# Patient Record
Sex: FEMALE | Race: ASIAN | NOT HISPANIC OR LATINO
[De-identification: names, ages, dates, MRNs, and addresses within clinical notes are randomized per-mention and may not be internally consistent; named-entity substitution may affect disease eponyms.]

---

## 2018-05-02 ENCOUNTER — APPOINTMENT (OUTPATIENT)
Dept: SURGERY | Facility: CLINIC | Age: 33
End: 2018-05-02
Payer: MEDICAID

## 2018-05-02 VITALS
SYSTOLIC BLOOD PRESSURE: 125 MMHG | HEIGHT: 64 IN | DIASTOLIC BLOOD PRESSURE: 87 MMHG | TEMPERATURE: 98.2 F | HEART RATE: 99 BPM | WEIGHT: 107 LBS | BODY MASS INDEX: 18.27 KG/M2

## 2018-05-02 DIAGNOSIS — R10.31 RIGHT LOWER QUADRANT PAIN: ICD-10-CM

## 2018-05-02 PROCEDURE — 99203 OFFICE O/P NEW LOW 30 MIN: CPT

## 2018-05-04 ENCOUNTER — TRANSCRIPTION ENCOUNTER (OUTPATIENT)
Age: 33
End: 2018-05-04

## 2018-07-23 ENCOUNTER — EMERGENCY (EMERGENCY)
Facility: HOSPITAL | Age: 33
LOS: 0 days | Discharge: HOME | End: 2018-07-24
Attending: EMERGENCY MEDICINE
Payer: MEDICAID

## 2018-07-23 VITALS
TEMPERATURE: 98 F | SYSTOLIC BLOOD PRESSURE: 129 MMHG | OXYGEN SATURATION: 97 % | DIASTOLIC BLOOD PRESSURE: 80 MMHG | WEIGHT: 106.48 LBS | HEART RATE: 89 BPM | RESPIRATION RATE: 17 BRPM | HEIGHT: 64 IN

## 2018-07-23 DIAGNOSIS — Z98.89 OTHER SPECIFIED POSTPROCEDURAL STATES: Chronic | ICD-10-CM

## 2018-07-23 DIAGNOSIS — H53.149 VISUAL DISCOMFORT, UNSPECIFIED: ICD-10-CM

## 2018-07-23 DIAGNOSIS — R51 HEADACHE: ICD-10-CM

## 2018-07-23 DIAGNOSIS — G43.909 MIGRAINE, UNSPECIFIED, NOT INTRACTABLE, WITHOUT STATUS MIGRAINOSUS: ICD-10-CM

## 2018-07-23 DIAGNOSIS — R11.10 VOMITING, UNSPECIFIED: ICD-10-CM

## 2018-07-23 LAB
ALBUMIN SERPL ELPH-MCNC: 3.9 G/DL — SIGNIFICANT CHANGE UP (ref 3.3–5)
ALP SERPL-CCNC: 75 U/L — SIGNIFICANT CHANGE UP (ref 40–120)
ALT FLD-CCNC: 26 U/L — SIGNIFICANT CHANGE UP (ref 12–78)
ANION GAP SERPL CALC-SCNC: 8 MMOL/L — SIGNIFICANT CHANGE UP (ref 5–17)
AST SERPL-CCNC: 21 U/L — SIGNIFICANT CHANGE UP (ref 15–37)
BASOPHILS # BLD AUTO: 0.03 K/UL — SIGNIFICANT CHANGE UP (ref 0–0.2)
BASOPHILS NFR BLD AUTO: 0.3 % — SIGNIFICANT CHANGE UP (ref 0–2)
BILIRUB SERPL-MCNC: 0.3 MG/DL — SIGNIFICANT CHANGE UP (ref 0.2–1.2)
BUN SERPL-MCNC: 8 MG/DL — SIGNIFICANT CHANGE UP (ref 7–23)
CALCIUM SERPL-MCNC: 9.1 MG/DL — SIGNIFICANT CHANGE UP (ref 8.5–10.1)
CHLORIDE SERPL-SCNC: 108 MMOL/L — SIGNIFICANT CHANGE UP (ref 96–108)
CO2 SERPL-SCNC: 26 MMOL/L — SIGNIFICANT CHANGE UP (ref 22–31)
CREAT SERPL-MCNC: 0.6 MG/DL — SIGNIFICANT CHANGE UP (ref 0.5–1.3)
EOSINOPHIL # BLD AUTO: 0.02 K/UL — SIGNIFICANT CHANGE UP (ref 0–0.5)
EOSINOPHIL NFR BLD AUTO: 0.2 % — SIGNIFICANT CHANGE UP (ref 0–6)
GLUCOSE SERPL-MCNC: 102 MG/DL — HIGH (ref 70–99)
HCG SERPL-ACNC: <1 MIU/ML — SIGNIFICANT CHANGE UP
HCT VFR BLD CALC: 37.9 % — SIGNIFICANT CHANGE UP (ref 34.5–45)
HGB BLD-MCNC: 11.8 G/DL — SIGNIFICANT CHANGE UP (ref 11.5–15.5)
IMM GRANULOCYTES NFR BLD AUTO: 0.2 % — SIGNIFICANT CHANGE UP (ref 0–1.5)
LYMPHOCYTES # BLD AUTO: 1.14 K/UL — SIGNIFICANT CHANGE UP (ref 1–3.3)
LYMPHOCYTES # BLD AUTO: 12.6 % — LOW (ref 13–44)
MCHC RBC-ENTMCNC: 25.2 PG — LOW (ref 27–34)
MCHC RBC-ENTMCNC: 31.1 GM/DL — LOW (ref 32–36)
MCV RBC AUTO: 80.8 FL — SIGNIFICANT CHANGE UP (ref 80–100)
MONOCYTES # BLD AUTO: 0.2 K/UL — SIGNIFICANT CHANGE UP (ref 0–0.9)
MONOCYTES NFR BLD AUTO: 2.2 % — SIGNIFICANT CHANGE UP (ref 2–14)
NEUTROPHILS # BLD AUTO: 7.66 K/UL — HIGH (ref 1.8–7.4)
NEUTROPHILS NFR BLD AUTO: 84.5 % — HIGH (ref 43–77)
NRBC # BLD: 0 /100 WBCS — SIGNIFICANT CHANGE UP (ref 0–0)
PLATELET # BLD AUTO: 239 K/UL — SIGNIFICANT CHANGE UP (ref 150–400)
POTASSIUM SERPL-MCNC: 4.1 MMOL/L — SIGNIFICANT CHANGE UP (ref 3.5–5.3)
POTASSIUM SERPL-SCNC: 4.1 MMOL/L — SIGNIFICANT CHANGE UP (ref 3.5–5.3)
PROT SERPL-MCNC: 8.3 GM/DL — SIGNIFICANT CHANGE UP (ref 6–8.3)
RBC # BLD: 4.69 M/UL — SIGNIFICANT CHANGE UP (ref 3.8–5.2)
RBC # FLD: 14.4 % — SIGNIFICANT CHANGE UP (ref 10.3–14.5)
SODIUM SERPL-SCNC: 142 MMOL/L — SIGNIFICANT CHANGE UP (ref 135–145)
WBC # BLD: 9.07 K/UL — SIGNIFICANT CHANGE UP (ref 3.8–10.5)
WBC # FLD AUTO: 9.07 K/UL — SIGNIFICANT CHANGE UP (ref 3.8–10.5)

## 2018-07-23 PROCEDURE — 70450 CT HEAD/BRAIN W/O DYE: CPT | Mod: 26

## 2018-07-23 PROCEDURE — 99284 EMERGENCY DEPT VISIT MOD MDM: CPT

## 2018-07-23 RX ORDER — ACETAMINOPHEN 500 MG
650 TABLET ORAL ONCE
Qty: 0 | Refills: 0 | Status: COMPLETED | OUTPATIENT
Start: 2018-07-23 | End: 2018-07-23

## 2018-07-23 RX ORDER — DIPHENHYDRAMINE HCL 50 MG
25 CAPSULE ORAL ONCE
Qty: 0 | Refills: 0 | Status: COMPLETED | OUTPATIENT
Start: 2018-07-23 | End: 2018-07-23

## 2018-07-23 RX ORDER — METOCLOPRAMIDE HCL 10 MG
10 TABLET ORAL ONCE
Qty: 0 | Refills: 0 | Status: COMPLETED | OUTPATIENT
Start: 2018-07-23 | End: 2018-07-23

## 2018-07-23 RX ORDER — SODIUM CHLORIDE 9 MG/ML
1000 INJECTION INTRAMUSCULAR; INTRAVENOUS; SUBCUTANEOUS ONCE
Qty: 0 | Refills: 0 | Status: COMPLETED | OUTPATIENT
Start: 2018-07-23 | End: 2018-07-23

## 2018-07-23 RX ADMIN — Medication 25 MILLIGRAM(S): at 22:27

## 2018-07-23 RX ADMIN — Medication 10 MILLIGRAM(S): at 22:27

## 2018-07-23 RX ADMIN — Medication 650 MILLIGRAM(S): at 22:27

## 2018-07-23 RX ADMIN — SODIUM CHLORIDE 1000 MILLILITER(S): 9 INJECTION INTRAMUSCULAR; INTRAVENOUS; SUBCUTANEOUS at 22:19

## 2018-07-23 NOTE — ED PROVIDER NOTE - OBJECTIVE STATEMENT
31yo female with pmh migraines presents with left sided headache, photophobia and vomit x 3. Pt states typical of her headache. Last exacerbation 2 years ago s/p epidural during pregnancy. Attempted to take tylenol but pt vomited.     ROS: No fever/chills. + photophobia, no eye pain/changes in vision, No ear pain/sore throat/dysphagia, No chest pain/palpitations. No SOB/cough/stridor. No abdominal pain, +N/V, no D, no black/bloody bm. No dysuria/frequency/discharge, + headache. No Dizziness.  No rash.  No numbness/tingling/weakness.

## 2018-07-23 NOTE — ED PROVIDER NOTE - MEDICAL DECISION MAKING DETAILS
miraine resolved, 0/10. Lab values do not require emergent intervention. Discussed results and outcome of testing with the patient.  Patient given copy of available results. Patient advised to please follow up with their PMD within the next 24 hours and return to the Emergency Department for worsening symptoms or any other concerns.

## 2018-07-23 NOTE — ED ADULT TRIAGE NOTE - CHIEF COMPLAINT QUOTE
pt states, "I am having migraines and vomiting". pt c/o pain to the enitre left side of head and vomited x3 today.

## 2018-07-24 VITALS
OXYGEN SATURATION: 98 % | SYSTOLIC BLOOD PRESSURE: 104 MMHG | TEMPERATURE: 98 F | RESPIRATION RATE: 16 BRPM | DIASTOLIC BLOOD PRESSURE: 56 MMHG | HEART RATE: 83 BPM

## 2018-07-24 NOTE — ED ADULT NURSE NOTE - OBJECTIVE STATEMENT
pt c/o migraine headache.  pt stated that her migraines started after having an epidural during childbirth.   no N/V at this time.

## 2018-11-01 ENCOUNTER — OUTPATIENT (OUTPATIENT)
Dept: OUTPATIENT SERVICES | Facility: HOSPITAL | Age: 33
LOS: 1 days | Discharge: HOME | End: 2018-11-01

## 2018-11-01 VITALS
RESPIRATION RATE: 18 BRPM | OXYGEN SATURATION: 97 % | DIASTOLIC BLOOD PRESSURE: 71 MMHG | HEART RATE: 90 BPM | SYSTOLIC BLOOD PRESSURE: 109 MMHG | TEMPERATURE: 98 F | WEIGHT: 110.23 LBS | HEIGHT: 64 IN

## 2018-11-01 VITALS
DIASTOLIC BLOOD PRESSURE: 71 MMHG | HEIGHT: 64 IN | SYSTOLIC BLOOD PRESSURE: 109 MMHG | OXYGEN SATURATION: 97 % | RESPIRATION RATE: 18 BRPM | WEIGHT: 110.23 LBS | TEMPERATURE: 98 F | HEART RATE: 90 BPM

## 2018-11-01 DIAGNOSIS — T83.83XA HEMORRHAGE DUE TO GENITOURINARY PROSTHETIC DEVICES, IMPLANTS AND GRAFTS, INITIAL ENCOUNTER: Chronic | ICD-10-CM

## 2018-11-01 DIAGNOSIS — Z01.818 ENCOUNTER FOR OTHER PREPROCEDURAL EXAMINATION: ICD-10-CM

## 2018-11-01 DIAGNOSIS — R22.2 LOCALIZED SWELLING, MASS AND LUMP, TRUNK: ICD-10-CM

## 2018-11-01 DIAGNOSIS — R22.9 LOCALIZED SWELLING, MASS AND LUMP, UNSPECIFIED: ICD-10-CM

## 2018-11-01 DIAGNOSIS — Z98.891 HISTORY OF UTERINE SCAR FROM PREVIOUS SURGERY: Chronic | ICD-10-CM

## 2018-11-01 DIAGNOSIS — Z98.89 OTHER SPECIFIED POSTPROCEDURAL STATES: Chronic | ICD-10-CM

## 2018-11-01 LAB
HCG UR QL: NEGATIVE — SIGNIFICANT CHANGE UP
HCT VFR BLD CALC: 38.3 % — SIGNIFICANT CHANGE UP (ref 34.5–45)
HGB BLD-MCNC: 12.4 G/DL — SIGNIFICANT CHANGE UP (ref 11.5–15.5)
MCHC RBC-ENTMCNC: 26.2 PG — LOW (ref 27–34)
MCHC RBC-ENTMCNC: 32.4 GM/DL — SIGNIFICANT CHANGE UP (ref 32–36)
MCV RBC AUTO: 80.8 FL — SIGNIFICANT CHANGE UP (ref 80–100)
NRBC # BLD: 0 /100 WBCS — SIGNIFICANT CHANGE UP (ref 0–0)
PLATELET # BLD AUTO: 301 K/UL — SIGNIFICANT CHANGE UP (ref 150–400)
RBC # BLD: 4.74 M/UL — SIGNIFICANT CHANGE UP (ref 3.8–5.2)
RBC # FLD: 13.7 % — SIGNIFICANT CHANGE UP (ref 10.3–14.5)
WBC # BLD: 8.1 K/UL — SIGNIFICANT CHANGE UP (ref 3.8–10.5)
WBC # FLD AUTO: 8.1 K/UL — SIGNIFICANT CHANGE UP (ref 3.8–10.5)

## 2018-11-01 NOTE — H&P PST ADULT - PMH
Localized swelling, mass and lump, unspecified    Migraines Localized swelling, mass and lump, unspecified    Migraines  secondary to epidural with

## 2018-11-01 NOTE — H&P PST ADULT - ASSESSMENT
excision of right abdominal wall mass on 11/8 with Dr. Hernandez 32 yo female scheduled for an excision of right abdominal wall mass on  with Dr. Held CAPRINI SCORE [CLOT]    AGE RELATED RISK FACTORS                                                       MOBILITY RELATED FACTORS  [ ] Age 41-60 years                                            (1 Point)                  [ ] Bed rest                                                        (1 Point)  [ ] Age: 61-74 years                                           (2 Points)                 [ ] Plaster cast                                                   (2 Points)  [ ] Age= 75 years                                              (3 Points)                 [ ] Bed bound for more than 72 hours                 (2 Points)    DISEASE RELATED RISK FACTORS                                               GENDER SPECIFIC FACTORS  [ ] Edema in the lower extremities                       (1 Point)                  [ ] Pregnancy                                                     (1 Point)  [ ] Varicose veins                                               (1 Point)                  [ ] Post-partum < 6 weeks                                   (1 Point)             [ ] BMI > 25 Kg/m2                                            (1 Point)                  [ ] Hormonal therapy  or oral contraception          (1 Point)                 [ ] Sepsis (in the previous month)                        (1 Point)                  [ ] History of pregnancy complications                 (1 point)  [ ] Pneumonia or serious lung disease                                               [ ] Unexplained or recurrent                     (1 Point)           (in the previous month)                               (1 Point)  [ ] Abnormal pulmonary function test                     (1 Point)                 SURGERY RELATED RISK FACTORS  [ ] Acute myocardial infarction                              (1 Point)                 [ ]  Section                                             (1 Point)  [ ] Congestive heart failure (in the previous month)  (1 Point)               [x ] Minor surgery                                                  (1 Point)   [ ] Inflammatory bowel disease                             (1 Point)                 [ ] Arthroscopic surgery                                        (2 Points)  [ ] Central venous access                                      (2 Points)                [ ] General surgery lasting more than 45 minutes   (2 Points)       [ ] Stroke (in the previous month)                          (5 Points)               [ ] Elective arthroplasty                                         (5 Points)                                                                                                                                               HEMATOLOGY RELATED FACTORS                                                 TRAUMA RELATED RISK FACTORS  [ ] Prior episodes of VTE                                     (3 Points)                [ ] Fracture of the hip, pelvis, or leg                       (5 Points)  [ ] Positive family history for VTE                         (3 Points)                 [ ] Acute spinal cord injury (in the previous month)  (5 Points)  [ ] Prothrombin 98905 A                                     (3 Points)                 [ ] Paralysis  (less than 1 month)                             (5 Points)  [ ] Factor V Leiden                                             (3 Points)                  [ ] Multiple Trauma within 1 month                        (5 Points)  [ ] Lupus anticoagulants                                     (3 Points)                                                           [ ] Anticardiolipin antibodies                               (3 Points)                                                       [ ] High homocysteine in the blood                      (3 Points)                                             [ ] Other congenital or acquired thrombophilia      (3 Points)                                                [ ] Heparin induced thrombocytopenia                  (3 Points)                                          Total Score [   1      ]

## 2018-11-01 NOTE — H&P PST ADULT - PROBLEM SELECTOR PLAN 2
Labs MC Pre op and Hibiclens instructions reviewed and given. Take routine am meds DOS with sip of water. Avoid NSAIDs and OTC supplements. Verbalized understanding Labs - CBC, UCG  No MC needed or requested  Pre op and Hibiclens instructions reviewed and given. Hold Prenatals. Avoid NSAIDs and OTC supplements. Verbalized understanding

## 2018-11-01 NOTE — H&P PST ADULT - HISTORY OF PRESENT ILLNESS
excision of right abdominal wall mass on 11/8 with Dr. Hernandez e33 yo healthy female scheduled for an excision of right abdominal wall mass on  with Dr. Hernandez. Reports a right sided lump under her  scar since 2018. C/O pain with activity. Reports an increase in size. Denies abd pain, N/V and bowel. 32 yo healthy female scheduled for an excision of right abdominal wall mass on  with Dr. Hernandez. Reports a right sided lump under her  scar since 2018. C/O pain with activity. Reports an increase in size. Denies abd pain, N/V and bowel chnages.

## 2018-11-01 NOTE — H&P PST ADULT - FAMILY HISTORY
Mother  Still living? Yes, Estimated age: Age Unknown  Family history of hypertension, Age at diagnosis: Age Unknown     Father  Still living? Yes, Estimated age: Age Unknown  Family history of gout, Age at diagnosis: Age Unknown  Family history of diabetes mellitus (DM), Age at diagnosis: Age Unknown

## 2018-11-01 NOTE — H&P PST ADULT - NSANTHOSAYNRD_GEN_A_CORE
No. DOMONIQUE screening performed.  STOP BANG Legend: 0-2 = LOW Risk; 3-4 = INTERMEDIATE Risk; 5-8 = HIGH Risk

## 2018-11-01 NOTE — ASU PATIENT PROFILE, ADULT - PSH
H/O  section  2015  History of removal of ovarian cyst    Intermenstrual spotting due to intrauterine device (IUD)

## 2018-11-02 PROBLEM — G43.909 MIGRAINE, UNSPECIFIED, NOT INTRACTABLE, WITHOUT STATUS MIGRAINOSUS: Chronic | Status: ACTIVE | Noted: 2018-07-24

## 2018-11-07 ENCOUNTER — TRANSCRIPTION ENCOUNTER (OUTPATIENT)
Age: 33
End: 2018-11-07

## 2018-11-08 ENCOUNTER — OUTPATIENT (OUTPATIENT)
Dept: OUTPATIENT SERVICES | Facility: HOSPITAL | Age: 33
LOS: 1 days | Discharge: HOME | End: 2018-11-08
Payer: MEDICAID

## 2018-11-08 ENCOUNTER — RESULT REVIEW (OUTPATIENT)
Age: 33
End: 2018-11-08

## 2018-11-08 ENCOUNTER — APPOINTMENT (OUTPATIENT)
Dept: SURGERY | Facility: HOSPITAL | Age: 33
End: 2018-11-08

## 2018-11-08 VITALS
HEIGHT: 64 IN | SYSTOLIC BLOOD PRESSURE: 95 MMHG | DIASTOLIC BLOOD PRESSURE: 58 MMHG | OXYGEN SATURATION: 100 % | HEART RATE: 75 BPM | TEMPERATURE: 97 F | WEIGHT: 110.23 LBS | RESPIRATION RATE: 16 BRPM

## 2018-11-08 VITALS
HEART RATE: 77 BPM | SYSTOLIC BLOOD PRESSURE: 97 MMHG | TEMPERATURE: 97 F | OXYGEN SATURATION: 99 % | RESPIRATION RATE: 18 BRPM | DIASTOLIC BLOOD PRESSURE: 56 MMHG

## 2018-11-08 DIAGNOSIS — Z98.891 HISTORY OF UTERINE SCAR FROM PREVIOUS SURGERY: Chronic | ICD-10-CM

## 2018-11-08 DIAGNOSIS — Z98.89 OTHER SPECIFIED POSTPROCEDURAL STATES: Chronic | ICD-10-CM

## 2018-11-08 DIAGNOSIS — T83.83XA HEMORRHAGE DUE TO GENITOURINARY PROSTHETIC DEVICES, IMPLANTS AND GRAFTS, INITIAL ENCOUNTER: Chronic | ICD-10-CM

## 2018-11-08 PROCEDURE — 22901 EXC ABDL TUM DEEP 5 CM/>: CPT | Mod: AS

## 2018-11-08 PROCEDURE — 22901 EXC ABDL TUM DEEP 5 CM/>: CPT

## 2018-11-08 PROCEDURE — 88305 TISSUE EXAM BY PATHOLOGIST: CPT | Mod: 26

## 2018-11-08 RX ORDER — ONDANSETRON 8 MG/1
4 TABLET, FILM COATED ORAL ONCE
Qty: 0 | Refills: 0 | Status: DISCONTINUED | OUTPATIENT
Start: 2018-11-08 | End: 2018-11-08

## 2018-11-08 RX ORDER — ACETAMINOPHEN 500 MG
1000 TABLET ORAL ONCE
Qty: 0 | Refills: 0 | Status: COMPLETED | OUTPATIENT
Start: 2018-11-08 | End: 2018-11-08

## 2018-11-08 RX ORDER — SODIUM CHLORIDE 9 MG/ML
1000 INJECTION, SOLUTION INTRAVENOUS
Qty: 0 | Refills: 0 | Status: DISCONTINUED | OUTPATIENT
Start: 2018-11-08 | End: 2018-11-08

## 2018-11-08 RX ORDER — SODIUM CHLORIDE 9 MG/ML
3 INJECTION INTRAMUSCULAR; INTRAVENOUS; SUBCUTANEOUS EVERY 8 HOURS
Qty: 0 | Refills: 0 | Status: DISCONTINUED | OUTPATIENT
Start: 2018-11-08 | End: 2018-11-08

## 2018-11-08 RX ADMIN — SODIUM CHLORIDE 75 MILLILITER(S): 9 INJECTION, SOLUTION INTRAVENOUS at 11:09

## 2018-11-08 RX ADMIN — Medication 1000 MILLIGRAM(S): at 11:23

## 2018-11-08 RX ADMIN — Medication 400 MILLIGRAM(S): at 11:08

## 2018-11-08 NOTE — BRIEF OPERATIVE NOTE - PROCEDURE
<<-----Click on this checkbox to enter Procedure Excision of mass of abdominal wall  11/08/2018    Active  DYAN

## 2018-11-08 NOTE — ASU DISCHARGE PLAN (ADULT/PEDIATRIC). - MEDICATION SUMMARY - MEDICATIONS TO TAKE
I will START or STAY ON the medications listed below when I get home from the hospital:    Prenatal Vitamins  -- 1 tab(s) by mouth once a day  -- Indication: For .    Tylenol 500 mg oral tablet  -- orally once a day, As Needed  -- Indication: For .

## 2018-11-08 NOTE — ASU DISCHARGE PLAN (ADULT/PEDIATRIC). - NOTIFY
Unable to Urinate/Bleeding that does not stop/Swelling that continues/Persistent Nausea and Vomiting/Fever greater than 101

## 2018-11-15 DIAGNOSIS — Z79.1 LONG TERM (CURRENT) USE OF NON-STEROIDAL ANTI-INFLAMMATORIES (NSAID): ICD-10-CM

## 2018-11-15 DIAGNOSIS — N80.8 OTHER ENDOMETRIOSIS: ICD-10-CM

## 2018-11-16 PROBLEM — R22.9 LOCALIZED SWELLING, MASS AND LUMP, UNSPECIFIED: Chronic | Status: ACTIVE | Noted: 2018-11-01

## 2018-11-19 ENCOUNTER — APPOINTMENT (OUTPATIENT)
Dept: SURGERY | Facility: CLINIC | Age: 33
End: 2018-11-19
Payer: MEDICAID

## 2018-11-19 VITALS
TEMPERATURE: 98.2 F | HEART RATE: 76 BPM | SYSTOLIC BLOOD PRESSURE: 103 MMHG | BODY MASS INDEX: 18.27 KG/M2 | DIASTOLIC BLOOD PRESSURE: 69 MMHG | HEIGHT: 64 IN | WEIGHT: 107 LBS

## 2018-11-19 DIAGNOSIS — Z09 ENCOUNTER FOR FOLLOW-UP EXAMINATION AFTER COMPLETED TREATMENT FOR CONDITIONS OTHER THAN MALIGNANT NEOPLASM: ICD-10-CM

## 2018-11-19 PROCEDURE — 99024 POSTOP FOLLOW-UP VISIT: CPT

## 2019-03-04 ENCOUNTER — TRANSCRIPTION ENCOUNTER (OUTPATIENT)
Age: 34
End: 2019-03-04

## 2019-10-09 ENCOUNTER — EMERGENCY (EMERGENCY)
Facility: HOSPITAL | Age: 34
LOS: 0 days | Discharge: HOME | End: 2019-10-09
Attending: EMERGENCY MEDICINE
Payer: COMMERCIAL

## 2019-10-09 VITALS
HEIGHT: 64 IN | SYSTOLIC BLOOD PRESSURE: 113 MMHG | DIASTOLIC BLOOD PRESSURE: 68 MMHG | RESPIRATION RATE: 16 BRPM | TEMPERATURE: 99 F | OXYGEN SATURATION: 100 % | HEART RATE: 72 BPM | WEIGHT: 113.98 LBS
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
## 2019-10-09 DIAGNOSIS — Z98.891 HISTORY OF UTERINE SCAR FROM PREVIOUS SURGERY: Chronic | ICD-10-CM

## 2019-10-09 DIAGNOSIS — M79.675 PAIN IN LEFT TOE(S): ICD-10-CM

## 2019-10-09 DIAGNOSIS — Z98.89 OTHER SPECIFIED POSTPROCEDURAL STATES: Chronic | ICD-10-CM

## 2019-10-09 DIAGNOSIS — T83.83XA HEMORRHAGE DUE TO GENITOURINARY PROSTHETIC DEVICES, IMPLANTS AND GRAFTS, INITIAL ENCOUNTER: Chronic | ICD-10-CM
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
## 2019-10-09 DIAGNOSIS — S99.822A OTHER SPECIFIED INJURIES OF LEFT FOOT, INITIAL ENCOUNTER: ICD-10-CM

## 2019-10-09 PROCEDURE — 99283 EMERGENCY DEPT VISIT LOW MDM: CPT

## 2019-10-09 PROCEDURE — 73630 X-RAY EXAM OF FOOT: CPT | Mod: 26,LT

## 2019-10-09 RX ORDER — IBUPROFEN 200 MG
600 TABLET ORAL ONCE
Refills: 0 | Status: COMPLETED | OUTPATIENT
Start: 2019-10-09 | End: 2019-10-09

## 2019-10-09 RX ADMIN — Medication 600 MILLIGRAM(S): at 20:59

## 2019-10-09 RX ADMIN — Medication 1 TABLET(S): at 20:54

## 2019-10-09 RX ADMIN — Medication 600 MILLIGRAM(S): at 20:54

## 2019-10-09 NOTE — ED PROVIDER NOTE - PATIENT PORTAL LINK FT
You can access the FollowMyHealth Patient Portal offered by [facility] by registering at the following website: [email or web address]. By joining [facility] [employer]’s FollowMyHealth portal, you will also be able to view your health information using other applications (apps) compatible with our system.

## 2019-10-09 NOTE — ED PROVIDER NOTE - CARE PROVIDER_API CALL
Marcial Das (DPM)  Surgery  [address] [place] [address], [address]  [place] [address]  Phone: [phone]  Fax: [phone]  Follow Up Time:

## 2019-11-21 ENCOUNTER — APPOINTMENT (OUTPATIENT)
Dept: OBGYN | Facility: CLINIC | Age: 34
End: 2019-11-21
Payer: COMMERCIAL

## 2019-11-21 PROCEDURE — 99213 OFFICE O/P EST LOW 20 MIN: CPT | Mod: 25

## 2019-11-21 PROCEDURE — 58301 REMOVE INTRAUTERINE DEVICE: CPT

## 2019-12-02 NOTE — ASU PATIENT PROFILE, ADULT - PREOP PAIN SCORE
"""Informed patient that their cataract(s) are not visually significant or do not meet the criteria for ""
"""Start Artificial tears left eye three times a day
5

## 2020-02-10 ENCOUNTER — APPOINTMENT (OUTPATIENT)
Dept: OBGYN | Facility: CLINIC | Age: 35
End: 2020-02-10

## 2020-04-02 ENCOUNTER — APPOINTMENT (OUTPATIENT)
Dept: OBGYN | Facility: CLINIC | Age: 35
End: 2020-04-02

## 2020-04-10 ENCOUNTER — APPOINTMENT (OUTPATIENT)
Dept: NEUROLOGY | Facility: CLINIC | Age: 35
End: 2020-04-10
Payer: MEDICAID

## 2020-04-10 PROCEDURE — 99204 OFFICE O/P NEW MOD 45 MIN: CPT | Mod: 95

## 2020-04-10 NOTE — PHYSICAL EXAM
[Person] : oriented to person [Place] : oriented to place [Time] : oriented to time [Short Term Intact] : short term memory intact [Fluency] : fluency intact [Current Events] : adequate knowledge of current events [Cranial Nerves Oculomotor (III)] : extraocular motion intact [Cranial Nerves Facial (VII)] : face symmetrical [Cranial Nerves Vestibulocochlear (VIII)] : hearing was intact bilaterally [Cranial Nerves Accessory (XI - Cranial And Spinal)] : head turning and shoulder shrug symmetric [Cranial Nerves Hypoglossal (XII)] : there was no tongue deviation with protrusion [Motor Strength] : muscle strength was normal in all four extremities [Abnormal Walk] : normal gait [Coordination - Dysmetria Impaired Finger-to-Nose Bilateral] : not present

## 2020-04-10 NOTE — DISCUSSION/SUMMARY
[FreeTextEntry1] : 34 W who is here for new visit. She may have had migraine exacerbation due to her pregnancy. She was advised to continue tylenol prn. She will f/u with me prn.\par \par physician location: home\par patient location: home\par \par I spent 25 minutes of face to face time, during which more than 50% of the time was spent on counseling. I explained the diagnosis, other possible diagnoses, workup, and management, as well as answered any questions.\par \par This is a telemedicine visit that was performed using real time 2-way audiovisual technology. Verbal consent to participate in video visit was obtained and patient was aware of their right to refuse to participate in services delivered via telemedicine and the alternative and potential limitations of participating in a telemedicine visit vs a face-to-face visit; I have also informed the patient of my current location in the [facility] and the names of all persons participating in the telemedicine service and their role in the encounter. The patient agrees to have this service via Telehealth.\par \par  This visit occurred during the Coronavirus (COVID-19) Public Health Emergency. I discussed with the patient the nature of our telemedicine visits, that: \par • I would evaluate the patient and recommend diagnostics and treatments based on my assessment \par • Our sessions are not being recorded and that personal health information is protected \par • Our team would provide follow up care in person if/when the patient needs it.\par

## 2020-04-10 NOTE — HISTORY OF PRESENT ILLNESS
[FreeTextEntry1] : verbal consent given on 04/10/2020 and 11:31  by SHANNAN MURPHY at [address]\[place] [address]\par \par Pt was last seen in 2016. Since then, she was tried on nortriptyline but b/c it caused sedation, she stopped taking it. She states over the past 2 months, she had migraine exacerbation. Now that she's on the 2nd month of her pregnancy, she is feeling better. She has nausea due to pregnancy.\par \par \par CONSENT FOR VIDEO MEDICAL VISIT1. I understand that my physician wishes me to engage in a telemedicine consultation.2. My physician has explained to me how the video conferencing technology will be used to affect such a consultation will not be the same as a direct patient/health care provider visit due to the fact that I will not be in the same room as my physician 3. I understand there are potential risks to this technology, including interruptions, unauthorized access and technical difficulties. I understand that my health care provider or I can discontinue the telemedicine consult/visit if it is felt that the videoconferencing connections are not adequate for the situation.4. I understand that my healthcare information may be shared with other individuals for scheduling and billing purposes. Others may also be present during the consultation other than my physician and consulting health care provider in order to operate the video equipment. The above mentioned people will all maintain confidentiality of the information obtained. I further understand that I will be informed of their presence in the consultation and thus will have the right to request the following: (1) omit specific details of my medical history/physical examination that are personally sensitive to me; (2) ask non-medical personnel to leave the telemedicine examination room: and or (3) terminate the consultation at any time.5. I have had the alternatives to a telemedicine consultation explained to me, and in choosing to participate in a telemedicine consultation. I understand that some parts of the exam involving physical tests may be conducted by individuals at my location at the direction of the consulting health care provider.6. In an emergent consultation, I understand that the responsibility of the telemedicine consulting specialist is to advise my local practitioner and that the specialist’s responsibility will conclude upon the termination of the video conference connection.7. I understand that billing will occur from both my physician and as a facility fee from the site from which I am presented.8. I have had a direct conversation with my physician, during which I had the opportunity to ask questions in regard to this procedure. My questions have been answered and the risks, benefits and any practical alternatives have been discussed with me in a language in which I understand\par

## 2020-04-23 ENCOUNTER — APPOINTMENT (OUTPATIENT)
Dept: ANTEPARTUM | Facility: CLINIC | Age: 35
End: 2020-04-23
Payer: MEDICAID

## 2020-04-23 PROCEDURE — 76801 OB US < 14 WKS SINGLE FETUS: CPT

## 2020-05-07 ENCOUNTER — APPOINTMENT (OUTPATIENT)
Dept: ANTEPARTUM | Facility: CLINIC | Age: 35
End: 2020-05-07
Payer: MEDICAID

## 2020-05-07 PROCEDURE — 76801 OB US < 14 WKS SINGLE FETUS: CPT

## 2020-05-07 PROCEDURE — 76813 OB US NUCHAL MEAS 1 GEST: CPT

## 2020-05-21 ENCOUNTER — APPOINTMENT (OUTPATIENT)
Dept: ANTEPARTUM | Facility: CLINIC | Age: 35
End: 2020-05-21
Payer: MEDICAID

## 2020-05-21 PROCEDURE — 76817 TRANSVAGINAL US OBSTETRIC: CPT

## 2020-05-21 PROCEDURE — 76815 OB US LIMITED FETUS(S): CPT

## 2020-06-04 ENCOUNTER — APPOINTMENT (OUTPATIENT)
Dept: ANTEPARTUM | Facility: CLINIC | Age: 35
End: 2020-06-04
Payer: MEDICAID

## 2020-06-04 PROCEDURE — 76817 TRANSVAGINAL US OBSTETRIC: CPT

## 2020-06-11 ENCOUNTER — APPOINTMENT (OUTPATIENT)
Dept: OBGYN | Facility: CLINIC | Age: 35
End: 2020-06-11
Payer: MEDICAID

## 2020-06-11 PROCEDURE — 96372 THER/PROPH/DIAG INJ SC/IM: CPT

## 2020-06-18 ENCOUNTER — APPOINTMENT (OUTPATIENT)
Dept: ANTEPARTUM | Facility: CLINIC | Age: 35
End: 2020-06-18
Payer: MEDICAID

## 2020-06-18 PROCEDURE — 76817 TRANSVAGINAL US OBSTETRIC: CPT

## 2020-06-18 PROCEDURE — 76815 OB US LIMITED FETUS(S): CPT

## 2020-06-25 ENCOUNTER — APPOINTMENT (OUTPATIENT)
Dept: ANTEPARTUM | Facility: CLINIC | Age: 35
End: 2020-06-25
Payer: MEDICAID

## 2020-06-25 PROCEDURE — 76817 TRANSVAGINAL US OBSTETRIC: CPT

## 2020-06-25 PROCEDURE — 76811 OB US DETAILED SNGL FETUS: CPT

## 2020-07-02 ENCOUNTER — APPOINTMENT (OUTPATIENT)
Dept: OBGYN | Facility: CLINIC | Age: 35
End: 2020-07-02
Payer: MEDICAID

## 2020-07-02 PROCEDURE — 96372 THER/PROPH/DIAG INJ SC/IM: CPT

## 2020-07-09 ENCOUNTER — APPOINTMENT (OUTPATIENT)
Dept: ANTEPARTUM | Facility: CLINIC | Age: 35
End: 2020-07-09
Payer: MEDICAID

## 2020-07-09 ENCOUNTER — APPOINTMENT (OUTPATIENT)
Dept: OBGYN | Facility: CLINIC | Age: 35
End: 2020-07-09
Payer: MEDICAID

## 2020-07-09 PROCEDURE — 0502F SUBSEQUENT PRENATAL CARE: CPT

## 2020-07-09 PROCEDURE — 76815 OB US LIMITED FETUS(S): CPT

## 2020-07-09 PROCEDURE — 76817 TRANSVAGINAL US OBSTETRIC: CPT

## 2020-07-16 ENCOUNTER — APPOINTMENT (OUTPATIENT)
Dept: OBGYN | Facility: CLINIC | Age: 35
End: 2020-07-16
Payer: MEDICAID

## 2020-07-16 PROCEDURE — 96372 THER/PROPH/DIAG INJ SC/IM: CPT

## 2020-07-23 ENCOUNTER — APPOINTMENT (OUTPATIENT)
Dept: OBGYN | Facility: CLINIC | Age: 35
End: 2020-07-23
Payer: MEDICAID

## 2020-07-23 PROCEDURE — 76816 OB US FOLLOW-UP PER FETUS: CPT

## 2020-07-23 PROCEDURE — 76817 TRANSVAGINAL US OBSTETRIC: CPT

## 2020-07-30 ENCOUNTER — APPOINTMENT (OUTPATIENT)
Dept: OBGYN | Facility: CLINIC | Age: 35
End: 2020-07-30
Payer: MEDICAID

## 2020-07-30 PROCEDURE — 96372 THER/PROPH/DIAG INJ SC/IM: CPT

## 2020-08-06 ENCOUNTER — APPOINTMENT (OUTPATIENT)
Dept: OBGYN | Facility: CLINIC | Age: 35
End: 2020-08-06
Payer: MEDICAID

## 2020-08-06 PROCEDURE — 0502F SUBSEQUENT PRENATAL CARE: CPT

## 2020-08-13 ENCOUNTER — APPOINTMENT (OUTPATIENT)
Dept: OBGYN | Facility: CLINIC | Age: 35
End: 2020-08-13
Payer: MEDICAID

## 2020-08-13 PROCEDURE — 96372 THER/PROPH/DIAG INJ SC/IM: CPT

## 2020-08-20 ENCOUNTER — APPOINTMENT (OUTPATIENT)
Dept: OBGYN | Facility: CLINIC | Age: 35
End: 2020-08-20
Payer: MEDICAID

## 2020-08-20 PROCEDURE — 0502F SUBSEQUENT PRENATAL CARE: CPT

## 2020-08-27 ENCOUNTER — APPOINTMENT (OUTPATIENT)
Dept: OBGYN | Facility: CLINIC | Age: 35
End: 2020-08-27
Payer: MEDICAID

## 2020-08-27 PROCEDURE — 96372 THER/PROPH/DIAG INJ SC/IM: CPT

## 2020-09-03 ENCOUNTER — APPOINTMENT (OUTPATIENT)
Dept: OBGYN | Facility: CLINIC | Age: 35
End: 2020-09-03
Payer: MEDICAID

## 2020-09-03 PROCEDURE — 96372 THER/PROPH/DIAG INJ SC/IM: CPT

## 2020-09-10 ENCOUNTER — APPOINTMENT (OUTPATIENT)
Dept: ANTEPARTUM | Facility: CLINIC | Age: 35
End: 2020-09-10
Payer: MEDICAID

## 2020-09-10 PROCEDURE — 76819 FETAL BIOPHYS PROFIL W/O NST: CPT

## 2020-09-10 PROCEDURE — 76816 OB US FOLLOW-UP PER FETUS: CPT

## 2020-09-17 ENCOUNTER — APPOINTMENT (OUTPATIENT)
Dept: OBGYN | Facility: CLINIC | Age: 35
End: 2020-09-17
Payer: MEDICAID

## 2020-09-17 PROCEDURE — 96372 THER/PROPH/DIAG INJ SC/IM: CPT

## 2020-09-24 ENCOUNTER — APPOINTMENT (OUTPATIENT)
Dept: OBGYN | Facility: CLINIC | Age: 35
End: 2020-09-24
Payer: MEDICAID

## 2020-09-24 PROCEDURE — 96372 THER/PROPH/DIAG INJ SC/IM: CPT

## 2020-10-01 ENCOUNTER — APPOINTMENT (OUTPATIENT)
Dept: OBGYN | Facility: CLINIC | Age: 35
End: 2020-10-01
Payer: MEDICAID

## 2020-10-01 PROCEDURE — 96372 THER/PROPH/DIAG INJ SC/IM: CPT

## 2020-10-03 ENCOUNTER — OUTPATIENT (OUTPATIENT)
Dept: INPATIENT UNIT | Facility: HOSPITAL | Age: 35
LOS: 1 days | Discharge: HOME | End: 2020-10-03
Payer: MEDICAID

## 2020-10-03 VITALS
HEART RATE: 97 BPM | TEMPERATURE: 98 F | RESPIRATION RATE: 16 BRPM | DIASTOLIC BLOOD PRESSURE: 71 MMHG | SYSTOLIC BLOOD PRESSURE: 111 MMHG

## 2020-10-03 VITALS — HEART RATE: 103 BPM | SYSTOLIC BLOOD PRESSURE: 93 MMHG | DIASTOLIC BLOOD PRESSURE: 52 MMHG

## 2020-10-03 DIAGNOSIS — Z98.891 HISTORY OF UTERINE SCAR FROM PREVIOUS SURGERY: Chronic | ICD-10-CM

## 2020-10-03 DIAGNOSIS — T83.83XA HEMORRHAGE DUE TO GENITOURINARY PROSTHETIC DEVICES, IMPLANTS AND GRAFTS, INITIAL ENCOUNTER: Chronic | ICD-10-CM

## 2020-10-03 DIAGNOSIS — O26.899 OTHER SPECIFIED PREGNANCY RELATED CONDITIONS, UNSPECIFIED TRIMESTER: ICD-10-CM

## 2020-10-03 DIAGNOSIS — Z98.89 OTHER SPECIFIED POSTPROCEDURAL STATES: Chronic | ICD-10-CM

## 2020-10-03 DIAGNOSIS — Z3A.00 WEEKS OF GESTATION OF PREGNANCY NOT SPECIFIED: ICD-10-CM

## 2020-10-03 LAB
APPEARANCE UR: CLEAR — SIGNIFICANT CHANGE UP
BASOPHILS # BLD AUTO: 0.04 K/UL — SIGNIFICANT CHANGE UP (ref 0–0.2)
BASOPHILS NFR BLD AUTO: 0.4 % — SIGNIFICANT CHANGE UP (ref 0–2)
BILIRUB UR-MCNC: NEGATIVE — SIGNIFICANT CHANGE UP
BLD GP AB SCN SERPL QL: NEGATIVE — SIGNIFICANT CHANGE UP
BLOOD UR QL VISUAL: NEGATIVE — SIGNIFICANT CHANGE UP
COLOR SPEC: SIGNIFICANT CHANGE UP
EOSINOPHIL # BLD AUTO: 0.44 K/UL — SIGNIFICANT CHANGE UP (ref 0–0.5)
EOSINOPHIL NFR BLD AUTO: 4.2 % — SIGNIFICANT CHANGE UP (ref 0–6)
GLUCOSE UR-MCNC: NEGATIVE — SIGNIFICANT CHANGE UP
HCT VFR BLD CALC: 37.1 % — SIGNIFICANT CHANGE UP (ref 34.5–45)
HGB BLD-MCNC: 12.1 G/DL — SIGNIFICANT CHANGE UP (ref 11.5–15.5)
IMM GRANULOCYTES NFR BLD AUTO: 1.7 % — HIGH (ref 0–1.5)
KETONES UR-MCNC: NEGATIVE — SIGNIFICANT CHANGE UP
LEUKOCYTE ESTERASE UR-ACNC: NEGATIVE — SIGNIFICANT CHANGE UP
LYMPHOCYTES # BLD AUTO: 1.7 K/UL — SIGNIFICANT CHANGE UP (ref 1–3.3)
LYMPHOCYTES # BLD AUTO: 16.1 % — SIGNIFICANT CHANGE UP (ref 13–44)
MCHC RBC-ENTMCNC: 27.8 PG — SIGNIFICANT CHANGE UP (ref 27–34)
MCHC RBC-ENTMCNC: 32.6 % — SIGNIFICANT CHANGE UP (ref 32–36)
MCV RBC AUTO: 85.3 FL — SIGNIFICANT CHANGE UP (ref 80–100)
MONOCYTES # BLD AUTO: 1.02 K/UL — HIGH (ref 0–0.9)
MONOCYTES NFR BLD AUTO: 9.6 % — SIGNIFICANT CHANGE UP (ref 2–14)
NEUTROPHILS # BLD AUTO: 7.2 K/UL — SIGNIFICANT CHANGE UP (ref 1.8–7.4)
NEUTROPHILS NFR BLD AUTO: 68 % — SIGNIFICANT CHANGE UP (ref 43–77)
NITRITE UR-MCNC: NEGATIVE — SIGNIFICANT CHANGE UP
NRBC # FLD: 0 K/UL — SIGNIFICANT CHANGE UP (ref 0–0)
PH UR: 7 — SIGNIFICANT CHANGE UP (ref 5–8)
PLATELET # BLD AUTO: 279 K/UL — SIGNIFICANT CHANGE UP (ref 150–400)
PMV BLD: 11 FL — SIGNIFICANT CHANGE UP (ref 7–13)
PROT UR-MCNC: NEGATIVE — SIGNIFICANT CHANGE UP
RBC # BLD: 4.35 M/UL — SIGNIFICANT CHANGE UP (ref 3.8–5.2)
RBC # FLD: 16.3 % — HIGH (ref 10.3–14.5)
RH IG SCN BLD-IMP: POSITIVE — SIGNIFICANT CHANGE UP
SP GR SPEC: 1.01 — SIGNIFICANT CHANGE UP (ref 1–1.04)
UROBILINOGEN FLD QL: NORMAL — SIGNIFICANT CHANGE UP
WBC # BLD: 10.58 K/UL — HIGH (ref 3.8–10.5)
WBC # FLD AUTO: 10.58 K/UL — HIGH (ref 3.8–10.5)

## 2020-10-03 PROCEDURE — 99214 OFFICE O/P EST MOD 30 MIN: CPT

## 2020-10-03 RX ORDER — ACETAMINOPHEN 500 MG
0 TABLET ORAL
Qty: 0 | Refills: 0 | DISCHARGE

## 2020-10-03 RX ORDER — SODIUM CHLORIDE 9 MG/ML
1000 INJECTION, SOLUTION INTRAVENOUS
Refills: 0 | Status: DISCONTINUED | OUTPATIENT
Start: 2020-10-03 | End: 2020-10-18

## 2020-10-03 RX ORDER — NIFEDIPINE 30 MG
10 TABLET, EXTENDED RELEASE 24 HR ORAL ONCE
Refills: 0 | Status: COMPLETED | OUTPATIENT
Start: 2020-10-03 | End: 2020-10-03

## 2020-10-03 RX ORDER — SODIUM CHLORIDE 9 MG/ML
1000 INJECTION, SOLUTION INTRAVENOUS ONCE
Refills: 0 | Status: COMPLETED | OUTPATIENT
Start: 2020-10-03 | End: 2020-10-03

## 2020-10-03 RX ADMIN — Medication 10 MILLIGRAM(S): at 17:59

## 2020-10-03 RX ADMIN — SODIUM CHLORIDE 2000 MILLILITER(S): 9 INJECTION, SOLUTION INTRAVENOUS at 17:48

## 2020-10-03 RX ADMIN — Medication 12 MILLIGRAM(S): at 17:50

## 2020-10-03 RX ADMIN — SODIUM CHLORIDE 125 MILLILITER(S): 9 INJECTION, SOLUTION INTRAVENOUS at 18:48

## 2020-10-03 RX ADMIN — Medication 10 MILLIGRAM(S): at 18:41

## 2020-10-03 NOTE — OB PROVIDER TRIAGE NOTE - NS_CHIEFCOMPLAINTOTHER_OBGYN_ALL_OB_FT
Presents with c/o "severe abdominal pain" that comes and goes since 1400. Denies LOF, VB and reports GFM.

## 2020-10-03 NOTE — OB PROVIDER TRIAGE NOTE - NSHPPHYSICALEXAM_GEN_ALL_CORE
Assessment reveals VSS. Abdomen soft, moderately tender upon palpation in all 4 quadrants. No rebound no guarding  VE 0.5/50/-3    UA sent Assessment reveals VSS. Abdomen soft, moderately tender upon palpation in all 4 quadrants. No rebound no guarding  VE 0.5/50/-3    UA sent    1745: Cat 1 FHT, ctx 3-5 on toco. Patient reports the abdominal pain was worse when it first started and is a little better at this time.   D/W Dr. Haley      LR 1000cc bolus      Beta 12mg IM      Procardia 10mg PO x 1 Assessment reveals VSS. Abdomen soft, moderately tender upon palpation in all 4 quadrants. No rebound no guarding  VE 0.5/50/-3    UA sent    1745: Cat 1 FHT, ctx 3-5 on toco. Patient reports the abdominal pain was worse when it first started and is a little better at this time.   D/W Dr. Haley      LR 1000cc bolus      Beta 12mg IM      Procardia 10mg PO x 1      Routine admission labs sent in anticipation of possible delivery      Patient cross matched x 2 units    BPP 8/8, MARYCRUZ 12, EFW 2150g, vtx anterior placenta  1820: At this time patient reports feeling better.     1835: Procardia 10mg Po ordered as per Dr. Haley Assessment reveals VSS. Abdomen soft, moderately tender upon palpation in all 4 quadrants. No rebound no guarding  VE 0.5/50/-3    UA sent    1745: Cat 1 FHT, ctx 3-5 on toco. Patient reports the abdominal pain was worse when it first started and is a little better at this time.   D/W Dr. Haley      LR 1000cc bolus      Beta 12mg IM      Procardia 10mg PO x 1      Routine admission labs sent in anticipation of possible delivery      Patient cross matched x 2 units    BPP 8/8, MARYCRUZ 12, EFW 2150g, vtx anterior placenta  1820: At this time patient reports feeling better.     1835: Procardia 10mg Po ordered as per Dr. Haley    1855: At this time patient reports feeling much better, last contraction she felt was 20 minutes ago.   Will continue to monitor.

## 2020-10-03 NOTE — OB RN TRIAGE NOTE - PMH
Localized swelling, mass and lump, unspecified    Migraines  secondary to epidural with   Vaginal delivery  2014 M 7 1/ lbs

## 2020-10-03 NOTE — OB RN TRIAGE NOTE - PSH
H/O  section  10/9/2015 @ 24weeks 1#11 (PPROM- admitted for 15 days) classical incision  History of removal of ovarian cyst    Intermenstrual spotting due to intrauterine device (IUD)

## 2020-10-03 NOTE — OB PROVIDER TRIAGE NOTE - HISTORY OF PRESENT ILLNESS
36yo  @ 34.0 presents with c/o severe abdominal pain and abdominal tenderness since 1400 Reports pain comes and goes. Denies fever, dysuria, LOF, VB, N/V. Reports GFM.   Reports she was told she would have rpt c/s at 34 weeks.    H/O  Lump removed from c/s scar    OB H/O 2014 Ft  7-8  10/9/2015 24 week PPROM- classical C/S- [id number]    AP course other wise uncomplicated.   Sheree inj from 15 weeks

## 2020-10-03 NOTE — OB PROVIDER TRIAGE NOTE - NSOBPROVIDERNOTE_OBGYN_ALL_OB_FT
@ 8:07 pm  CAt 1 tracing  TOCO: ctx none  VE: Ft/L/H  Patient denies abdominal pain of ctx, requesting to go home  s/p IV bolus  s/p Procardia 20 mg  Discussed with Dr. Brant Parker for discharge

## 2020-10-04 ENCOUNTER — OUTPATIENT (OUTPATIENT)
Dept: INPATIENT UNIT | Facility: HOSPITAL | Age: 35
LOS: 1 days | Discharge: HOME | End: 2020-10-04

## 2020-10-04 DIAGNOSIS — O26.899 OTHER SPECIFIED PREGNANCY RELATED CONDITIONS, UNSPECIFIED TRIMESTER: ICD-10-CM

## 2020-10-04 DIAGNOSIS — T83.83XA HEMORRHAGE DUE TO GENITOURINARY PROSTHETIC DEVICES, IMPLANTS AND GRAFTS, INITIAL ENCOUNTER: Chronic | ICD-10-CM

## 2020-10-04 DIAGNOSIS — Z98.89 OTHER SPECIFIED POSTPROCEDURAL STATES: Chronic | ICD-10-CM

## 2020-10-04 DIAGNOSIS — Z3A.00 WEEKS OF GESTATION OF PREGNANCY NOT SPECIFIED: ICD-10-CM

## 2020-10-04 DIAGNOSIS — Z98.891 HISTORY OF UTERINE SCAR FROM PREVIOUS SURGERY: Chronic | ICD-10-CM

## 2020-10-04 RX ADMIN — Medication 12 MILLIGRAM(S): at 18:49

## 2020-10-04 NOTE — CHART NOTE - NSCHARTNOTEFT_GEN_A_CORE
Chief Complaint:  presents for 2nd Beta shot  seen in triage 10/3/20 for PTL and received beta shot #1    denied any contractions  feels gfm    EDC: 20     Gestational Age:34.1 weeks     Parity: 1102     Ob History: FT                      24 weeks VD pTL     Vital Signs:   1850p  107/49  P 93  R 20 T 36.6 orally     Acuity Score:  4    Additional Comments:  pt received Betamethasone shot #2 1850 p    advised to follow up with PMD this week    return if any s.s of labor     Josh PATTON Chief Complaint:  presents for 2nd Beta shot  seen in triage 10/3/20 for PTL and received beta shot #1    denied any contractions  feels gfm    EDC: 20       Gestational Age:34.1 weeks     Parity: 1102      Ob History: OB H/O   2014 Ft  7-8  10/9/2015 24 week PPROM- classical C/S- [id number]   will be for scheduled RCS                   pt in NAD       Vital Signs:   1850p  107/49  P 93  R 20 T 36.6 orally     Acuity Score:  4      Additional Comments:  pt received Betamethasone shot #2 1850 p    advised to follow up with PMD this week    return if any s.s of labor  any VB any rom         Josh PATTON

## 2020-10-05 LAB — T PALLIDUM AB TITR SER: NEGATIVE — SIGNIFICANT CHANGE UP

## 2020-10-08 ENCOUNTER — APPOINTMENT (OUTPATIENT)
Dept: ANTEPARTUM | Facility: CLINIC | Age: 35
End: 2020-10-08
Payer: MEDICAID

## 2020-10-08 PROCEDURE — 76818 FETAL BIOPHYS PROFILE W/NST: CPT

## 2020-10-08 PROCEDURE — 96372 THER/PROPH/DIAG INJ SC/IM: CPT | Mod: 59

## 2020-10-08 PROCEDURE — 76816 OB US FOLLOW-UP PER FETUS: CPT

## 2020-10-15 ENCOUNTER — APPOINTMENT (OUTPATIENT)
Dept: ANTEPARTUM | Facility: CLINIC | Age: 35
End: 2020-10-15
Payer: MEDICAID

## 2020-10-15 ENCOUNTER — APPOINTMENT (OUTPATIENT)
Dept: OBGYN | Facility: CLINIC | Age: 35
End: 2020-10-15

## 2020-10-15 PROCEDURE — 76818 FETAL BIOPHYS PROFILE W/NST: CPT

## 2020-10-15 PROCEDURE — 76816 OB US FOLLOW-UP PER FETUS: CPT

## 2020-10-15 PROCEDURE — 76820 UMBILICAL ARTERY ECHO: CPT

## 2020-10-16 ENCOUNTER — OUTPATIENT (OUTPATIENT)
Dept: OUTPATIENT SERVICES | Facility: HOSPITAL | Age: 35
LOS: 1 days | End: 2020-10-16

## 2020-10-16 VITALS
RESPIRATION RATE: 16 BRPM | HEIGHT: 62 IN | TEMPERATURE: 98 F | OXYGEN SATURATION: 98 % | WEIGHT: 138.01 LBS | HEART RATE: 89 BPM | SYSTOLIC BLOOD PRESSURE: 101 MMHG | DIASTOLIC BLOOD PRESSURE: 69 MMHG

## 2020-10-16 DIAGNOSIS — T83.83XA HEMORRHAGE DUE TO GENITOURINARY PROSTHETIC DEVICES, IMPLANTS AND GRAFTS, INITIAL ENCOUNTER: Chronic | ICD-10-CM

## 2020-10-16 DIAGNOSIS — O34.219 MATERNAL CARE FOR UNSPECIFIED TYPE SCAR FROM PREVIOUS CESAREAN DELIVERY: ICD-10-CM

## 2020-10-16 DIAGNOSIS — Z98.891 HISTORY OF UTERINE SCAR FROM PREVIOUS SURGERY: Chronic | ICD-10-CM

## 2020-10-16 DIAGNOSIS — Z98.89 OTHER SPECIFIED POSTPROCEDURAL STATES: Chronic | ICD-10-CM

## 2020-10-16 DIAGNOSIS — D17.1 BENIGN LIPOMATOUS NEOPLASM OF SKIN AND SUBCUTANEOUS TISSUE OF TRUNK: Chronic | ICD-10-CM

## 2020-10-16 LAB
APPEARANCE UR: CLEAR — SIGNIFICANT CHANGE UP
BILIRUB UR-MCNC: NEGATIVE — SIGNIFICANT CHANGE UP
BLD GP AB SCN SERPL QL: NEGATIVE — SIGNIFICANT CHANGE UP
BLOOD UR QL VISUAL: NEGATIVE — SIGNIFICANT CHANGE UP
COLOR SPEC: SIGNIFICANT CHANGE UP
GLUCOSE UR-MCNC: 100 — SIGNIFICANT CHANGE UP
HCT VFR BLD CALC: 38.9 % — SIGNIFICANT CHANGE UP (ref 34.5–45)
HGB BLD-MCNC: 12.5 G/DL — SIGNIFICANT CHANGE UP (ref 11.5–15.5)
KETONES UR-MCNC: NEGATIVE — SIGNIFICANT CHANGE UP
LEUKOCYTE ESTERASE UR-ACNC: NEGATIVE — SIGNIFICANT CHANGE UP
MCHC RBC-ENTMCNC: 27.2 PG — SIGNIFICANT CHANGE UP (ref 27–34)
MCHC RBC-ENTMCNC: 32.1 % — SIGNIFICANT CHANGE UP (ref 32–36)
MCV RBC AUTO: 84.6 FL — SIGNIFICANT CHANGE UP (ref 80–100)
NITRITE UR-MCNC: NEGATIVE — SIGNIFICANT CHANGE UP
NRBC # FLD: 0 K/UL — SIGNIFICANT CHANGE UP (ref 0–0)
PH UR: 7 — SIGNIFICANT CHANGE UP (ref 5–8)
PLATELET # BLD AUTO: 268 K/UL — SIGNIFICANT CHANGE UP (ref 150–400)
PMV BLD: 10.4 FL — SIGNIFICANT CHANGE UP (ref 7–13)
PROT UR-MCNC: 10 — SIGNIFICANT CHANGE UP
RBC # BLD: 4.6 M/UL — SIGNIFICANT CHANGE UP (ref 3.8–5.2)
RBC # FLD: 16.3 % — HIGH (ref 10.3–14.5)
RH IG SCN BLD-IMP: POSITIVE — SIGNIFICANT CHANGE UP
SP GR SPEC: 1.01 — SIGNIFICANT CHANGE UP (ref 1–1.04)
UROBILINOGEN FLD QL: NORMAL — SIGNIFICANT CHANGE UP
WBC # BLD: 11.47 K/UL — HIGH (ref 3.8–10.5)
WBC # FLD AUTO: 11.47 K/UL — HIGH (ref 3.8–10.5)

## 2020-10-16 RX ORDER — SODIUM CHLORIDE 9 MG/ML
1000 INJECTION, SOLUTION INTRAVENOUS
Refills: 0 | Status: DISCONTINUED | OUTPATIENT
Start: 2020-10-20 | End: 2020-10-20

## 2020-10-16 NOTE — OB PST NOTE - HISTORY OF PRESENT ILLNESS
35 y.o. female  scheduled for repeat , denies complications with current pregnancy, reports good fetal movement  2014  @ 38 weeks gestation  10/2015  @ 24 weeks gestations "i was hospitalized for 15 days and my water broke"

## 2020-10-16 NOTE — OB PST NOTE - TEACHING/LEARNING LEARNING PREFERENCES
video/individual instruction/verbal instruction/computer/internet/skill demonstration/group instruction

## 2020-10-16 NOTE — OB PST NOTE - ANESTHESIA, PREVIOUS REACTION, PROFILE
reports " I have headaches since I had epidural in 2015", pt denies family hx of complications with anesthesia

## 2020-10-16 NOTE — OB PST NOTE - PROBLEM SELECTOR PLAN 1
pt scheduled for repeat   Preop instructions provided. Pt verbalized understanding.    written and verbal instructions with teach back on chlorhexidine shampoo provided,  pt verbalized understanding with returned demonstration

## 2020-10-16 NOTE — OB PST NOTE - NSHPPHYSICALEXAM_GEN_ALL_CORE
Constitutional: Well Developed, Well Groomed, Well Nourished, No Distress    Eyes: PERRL, EOMI, conjunctiva clear    Ears: Normal    Mouth & Gums: Normal, moist    Pharynx: No tenderness, discharge, or peritonsillar abscess    Tonsils: No Redness, discharge, tenderness, or swelling    Neck: Supple, no JVD, normal thyroid glands, no carotid bruits, no cervical vertebral or paraspinal tenderness    Breast: Normal shape    Back: Normal shape, ROM intact, strength intact, no vertebral tenderness    Respiratory: Airway patent, breath sounds equal, good air movement, respiration non-labored, clear to auscultation bilateral    Cardiovascular:  Regular rate and rhythm, no rubs or murmur    Gastrointestinal: gravid abdomen    Extremities: No clubbing, cyanosis, or pedal edema    Vascular:  Radial Pulse normal, DP pulse normal    Neurological: alert & oriented x 3, sensation intact, deep reflexes intact, cranial nerve intact, normal strength    Skin: warm and dry, normal color    Lymph Nodes: normal posterior cervical lymph node, normal anterior cervical lymph node, normal supraclavicular lymph node    Musculoskeletal: ROM intact, no joint swelling, warmth, or calf tenderness. Normal strength    Psychiatric: normal affect, normal behavior

## 2020-10-16 NOTE — OB PST NOTE - PSH
Abdominal lipoma  s/p excision   H/O  section  10/9/2015 @ 24weeks 1#11 (PPROM- admitted for 15 days) classical incision  History of removal of ovarian cyst    Intermenstrual spotting due to intrauterine device (IUD)

## 2020-10-17 ENCOUNTER — APPOINTMENT (OUTPATIENT)
Dept: OBGYN | Facility: CLINIC | Age: 35
End: 2020-10-17

## 2020-10-19 ENCOUNTER — TRANSCRIPTION ENCOUNTER (OUTPATIENT)
Age: 35
End: 2020-10-19

## 2020-10-20 ENCOUNTER — INPATIENT (INPATIENT)
Facility: HOSPITAL | Age: 35
LOS: 2 days | Discharge: HOME | End: 2020-10-23
Attending: OBSTETRICS & GYNECOLOGY | Admitting: OBSTETRICS & GYNECOLOGY
Payer: MEDICAID

## 2020-10-20 VITALS
TEMPERATURE: 98 F | HEART RATE: 99 BPM | RESPIRATION RATE: 14 BRPM | WEIGHT: 138.01 LBS | HEIGHT: 64 IN | SYSTOLIC BLOOD PRESSURE: 103 MMHG | DIASTOLIC BLOOD PRESSURE: 66 MMHG

## 2020-10-20 DIAGNOSIS — O34.219 MATERNAL CARE FOR UNSPECIFIED TYPE SCAR FROM PREVIOUS CESAREAN DELIVERY: ICD-10-CM

## 2020-10-20 DIAGNOSIS — Z98.891 HISTORY OF UTERINE SCAR FROM PREVIOUS SURGERY: Chronic | ICD-10-CM

## 2020-10-20 DIAGNOSIS — Z98.89 OTHER SPECIFIED POSTPROCEDURAL STATES: Chronic | ICD-10-CM

## 2020-10-20 DIAGNOSIS — D17.1 BENIGN LIPOMATOUS NEOPLASM OF SKIN AND SUBCUTANEOUS TISSUE OF TRUNK: Chronic | ICD-10-CM

## 2020-10-20 DIAGNOSIS — T83.83XA HEMORRHAGE DUE TO GENITOURINARY PROSTHETIC DEVICES, IMPLANTS AND GRAFTS, INITIAL ENCOUNTER: Chronic | ICD-10-CM

## 2020-10-20 LAB
BASOPHILS # BLD AUTO: 0.06 K/UL — SIGNIFICANT CHANGE UP (ref 0–0.2)
BASOPHILS NFR BLD AUTO: 0.7 % — SIGNIFICANT CHANGE UP (ref 0–2)
BASOPHILS NFR SPEC: 0 % — SIGNIFICANT CHANGE UP (ref 0–2)
BLD GP AB SCN SERPL QL: NEGATIVE — SIGNIFICANT CHANGE UP
EOSINOPHIL # BLD AUTO: 0.18 K/UL — SIGNIFICANT CHANGE UP (ref 0–0.5)
EOSINOPHIL NFR BLD AUTO: 2 % — SIGNIFICANT CHANGE UP (ref 0–6)
EOSINOPHIL NFR FLD: 1.7 % — SIGNIFICANT CHANGE UP (ref 0–6)
GIANT PLATELETS BLD QL SMEAR: PRESENT — SIGNIFICANT CHANGE UP
HCT VFR BLD CALC: 39.6 % — SIGNIFICANT CHANGE UP (ref 34.5–45)
HGB BLD-MCNC: 12.5 G/DL — SIGNIFICANT CHANGE UP (ref 11.5–15.5)
IMM GRANULOCYTES NFR BLD AUTO: 2.2 % — HIGH (ref 0–1.5)
LYMPHOCYTES # BLD AUTO: 1.62 K/UL — SIGNIFICANT CHANGE UP (ref 1–3.3)
LYMPHOCYTES # BLD AUTO: 17.8 % — SIGNIFICANT CHANGE UP (ref 13–44)
LYMPHOCYTES NFR SPEC AUTO: 10.5 % — LOW (ref 13–44)
MCHC RBC-ENTMCNC: 27.4 PG — SIGNIFICANT CHANGE UP (ref 27–34)
MCHC RBC-ENTMCNC: 31.6 % — LOW (ref 32–36)
MCV RBC AUTO: 86.8 FL — SIGNIFICANT CHANGE UP (ref 80–100)
METAMYELOCYTES # FLD: 0.9 % — SIGNIFICANT CHANGE UP (ref 0–1)
MONOCYTES # BLD AUTO: 1.09 K/UL — HIGH (ref 0–0.9)
MONOCYTES NFR BLD AUTO: 12 % — SIGNIFICANT CHANGE UP (ref 2–14)
MONOCYTES NFR BLD: 6.1 % — SIGNIFICANT CHANGE UP (ref 2–9)
MYELOCYTES NFR BLD: 0.9 % — HIGH (ref 0–0)
NEUTROPHIL AB SER-ACNC: 77.2 % — HIGH (ref 43–77)
NEUTROPHILS # BLD AUTO: 5.96 K/UL — SIGNIFICANT CHANGE UP (ref 1.8–7.4)
NEUTROPHILS NFR BLD AUTO: 65.3 % — SIGNIFICANT CHANGE UP (ref 43–77)
NEUTS BAND # BLD: 0.9 % — SIGNIFICANT CHANGE UP (ref 0–6)
NRBC # FLD: 0 K/UL — SIGNIFICANT CHANGE UP (ref 0–0)
PLATELET # BLD AUTO: 233 K/UL — SIGNIFICANT CHANGE UP (ref 150–400)
PLATELET COUNT - ESTIMATE: NORMAL — SIGNIFICANT CHANGE UP
PMV BLD: 10.6 FL — SIGNIFICANT CHANGE UP (ref 7–13)
RBC # BLD: 4.56 M/UL — SIGNIFICANT CHANGE UP (ref 3.8–5.2)
RBC # FLD: 15.9 % — HIGH (ref 10.3–14.5)
RH IG SCN BLD-IMP: POSITIVE — SIGNIFICANT CHANGE UP
SARS-COV-2 IGG SERPL QL IA: NEGATIVE — SIGNIFICANT CHANGE UP
SARS-COV-2 IGM SERPL IA-ACNC: 0.18 INDEX — SIGNIFICANT CHANGE UP
T PALLIDUM AB TITR SER: NEGATIVE — SIGNIFICANT CHANGE UP
VARIANT LYMPHS # BLD: 1.8 % — SIGNIFICANT CHANGE UP
WBC # BLD: 9.11 K/UL — SIGNIFICANT CHANGE UP (ref 3.8–10.5)
WBC # FLD AUTO: 9.11 K/UL — SIGNIFICANT CHANGE UP (ref 3.8–10.5)

## 2020-10-20 PROCEDURE — 59510 CESAREAN DELIVERY: CPT | Mod: U7

## 2020-10-20 PROCEDURE — 59025 FETAL NON-STRESS TEST: CPT | Mod: 26

## 2020-10-20 PROCEDURE — 59514 CESAREAN DELIVERY ONLY: CPT | Mod: AS,U8

## 2020-10-20 RX ORDER — INFLUENZA VIRUS VACCINE 15; 15; 15; 15 UG/.5ML; UG/.5ML; UG/.5ML; UG/.5ML
0.5 SUSPENSION INTRAMUSCULAR ONCE
Refills: 0 | Status: COMPLETED | OUTPATIENT
Start: 2020-10-20 | End: 2020-10-20

## 2020-10-20 RX ORDER — HYDROMORPHONE HYDROCHLORIDE 2 MG/ML
1 INJECTION INTRAMUSCULAR; INTRAVENOUS; SUBCUTANEOUS
Refills: 0 | Status: DISCONTINUED | OUTPATIENT
Start: 2020-10-20 | End: 2020-10-21

## 2020-10-20 RX ORDER — TETANUS TOXOID, REDUCED DIPHTHERIA TOXOID AND ACELLULAR PERTUSSIS VACCINE, ADSORBED 5; 2.5; 8; 8; 2.5 [IU]/.5ML; [IU]/.5ML; UG/.5ML; UG/.5ML; UG/.5ML
0.5 SUSPENSION INTRAMUSCULAR ONCE
Refills: 0 | Status: DISCONTINUED | OUTPATIENT
Start: 2020-10-20 | End: 2020-10-23

## 2020-10-20 RX ORDER — OXYCODONE HYDROCHLORIDE 5 MG/1
5 TABLET ORAL
Refills: 0 | Status: DISCONTINUED | OUTPATIENT
Start: 2020-10-20 | End: 2020-10-23

## 2020-10-20 RX ORDER — SODIUM CHLORIDE 9 MG/ML
1000 INJECTION, SOLUTION INTRAVENOUS
Refills: 0 | Status: DISCONTINUED | OUTPATIENT
Start: 2020-10-20 | End: 2020-10-20

## 2020-10-20 RX ORDER — MAGNESIUM HYDROXIDE 400 MG/1
30 TABLET, CHEWABLE ORAL
Refills: 0 | Status: DISCONTINUED | OUTPATIENT
Start: 2020-10-20 | End: 2020-10-23

## 2020-10-20 RX ORDER — DIPHENHYDRAMINE HCL 50 MG
25 CAPSULE ORAL EVERY 4 HOURS
Refills: 0 | Status: DISCONTINUED | OUTPATIENT
Start: 2020-10-20 | End: 2020-10-21

## 2020-10-20 RX ORDER — IBUPROFEN 200 MG
600 TABLET ORAL EVERY 6 HOURS
Refills: 0 | Status: COMPLETED | OUTPATIENT
Start: 2020-10-20 | End: 2021-09-18

## 2020-10-20 RX ORDER — OXYCODONE HYDROCHLORIDE 5 MG/1
5 TABLET ORAL
Refills: 0 | Status: DISCONTINUED | OUTPATIENT
Start: 2020-10-20 | End: 2020-10-21

## 2020-10-20 RX ORDER — CITRIC ACID/SODIUM CITRATE 300-500 MG
30 SOLUTION, ORAL ORAL ONCE
Refills: 0 | Status: COMPLETED | OUTPATIENT
Start: 2020-10-20 | End: 2020-10-20

## 2020-10-20 RX ORDER — DIPHENHYDRAMINE HCL 50 MG
25 CAPSULE ORAL EVERY 6 HOURS
Refills: 0 | Status: DISCONTINUED | OUTPATIENT
Start: 2020-10-20 | End: 2020-10-23

## 2020-10-20 RX ORDER — FAMOTIDINE 10 MG/ML
20 INJECTION INTRAVENOUS ONCE
Refills: 0 | Status: COMPLETED | OUTPATIENT
Start: 2020-10-20 | End: 2020-10-20

## 2020-10-20 RX ORDER — MORPHINE SULFATE 50 MG/1
2 CAPSULE, EXTENDED RELEASE ORAL ONCE
Refills: 0 | Status: DISCONTINUED | OUTPATIENT
Start: 2020-10-20 | End: 2020-10-21

## 2020-10-20 RX ORDER — ONDANSETRON 8 MG/1
4 TABLET, FILM COATED ORAL ONCE
Refills: 0 | Status: DISCONTINUED | OUTPATIENT
Start: 2020-10-20 | End: 2020-10-20

## 2020-10-20 RX ORDER — FERROUS SULFATE 325(65) MG
325 TABLET ORAL DAILY
Refills: 0 | Status: DISCONTINUED | OUTPATIENT
Start: 2020-10-20 | End: 2020-10-21

## 2020-10-20 RX ORDER — OXYCODONE HYDROCHLORIDE 5 MG/1
5 TABLET ORAL ONCE
Refills: 0 | Status: DISCONTINUED | OUTPATIENT
Start: 2020-10-20 | End: 2020-10-23

## 2020-10-20 RX ORDER — HYDROMORPHONE HYDROCHLORIDE 2 MG/ML
1 INJECTION INTRAMUSCULAR; INTRAVENOUS; SUBCUTANEOUS
Refills: 0 | Status: DISCONTINUED | OUTPATIENT
Start: 2020-10-20 | End: 2020-10-20

## 2020-10-20 RX ORDER — OXYCODONE HYDROCHLORIDE 5 MG/1
10 TABLET ORAL
Refills: 0 | Status: DISCONTINUED | OUTPATIENT
Start: 2020-10-20 | End: 2020-10-21

## 2020-10-20 RX ORDER — MORPHINE SULFATE 50 MG/1
0.1 CAPSULE, EXTENDED RELEASE ORAL ONCE
Refills: 0 | Status: DISCONTINUED | OUTPATIENT
Start: 2020-10-20 | End: 2020-10-20

## 2020-10-20 RX ORDER — LANOLIN
1 OINTMENT (GRAM) TOPICAL EVERY 6 HOURS
Refills: 0 | Status: DISCONTINUED | OUTPATIENT
Start: 2020-10-20 | End: 2020-10-23

## 2020-10-20 RX ORDER — OXYTOCIN 10 UNIT/ML
333.33 VIAL (ML) INJECTION
Qty: 20 | Refills: 0 | Status: DISCONTINUED | OUTPATIENT
Start: 2020-10-20 | End: 2020-10-20

## 2020-10-20 RX ORDER — METOCLOPRAMIDE HCL 10 MG
10 TABLET ORAL ONCE
Refills: 0 | Status: COMPLETED | OUTPATIENT
Start: 2020-10-20 | End: 2020-10-20

## 2020-10-20 RX ORDER — HYDROMORPHONE HYDROCHLORIDE 2 MG/ML
0.5 INJECTION INTRAMUSCULAR; INTRAVENOUS; SUBCUTANEOUS
Refills: 0 | Status: DISCONTINUED | OUTPATIENT
Start: 2020-10-20 | End: 2020-10-20

## 2020-10-20 RX ORDER — HEPARIN SODIUM 5000 [USP'U]/ML
5000 INJECTION INTRAVENOUS; SUBCUTANEOUS EVERY 12 HOURS
Refills: 0 | Status: DISCONTINUED | OUTPATIENT
Start: 2020-10-20 | End: 2020-10-23

## 2020-10-20 RX ORDER — ACETAMINOPHEN 500 MG
975 TABLET ORAL
Refills: 0 | Status: DISCONTINUED | OUTPATIENT
Start: 2020-10-20 | End: 2020-10-23

## 2020-10-20 RX ORDER — KETOROLAC TROMETHAMINE 30 MG/ML
30 SYRINGE (ML) INJECTION EVERY 6 HOURS
Refills: 0 | Status: DISCONTINUED | OUTPATIENT
Start: 2020-10-20 | End: 2020-10-21

## 2020-10-20 RX ORDER — SIMETHICONE 80 MG/1
80 TABLET, CHEWABLE ORAL EVERY 4 HOURS
Refills: 0 | Status: DISCONTINUED | OUTPATIENT
Start: 2020-10-20 | End: 2020-10-23

## 2020-10-20 RX ORDER — ONDANSETRON 8 MG/1
4 TABLET, FILM COATED ORAL EVERY 6 HOURS
Refills: 0 | Status: DISCONTINUED | OUTPATIENT
Start: 2020-10-20 | End: 2020-10-21

## 2020-10-20 RX ORDER — SODIUM CHLORIDE 9 MG/ML
1000 INJECTION, SOLUTION INTRAVENOUS ONCE
Refills: 0 | Status: COMPLETED | OUTPATIENT
Start: 2020-10-20 | End: 2020-10-20

## 2020-10-20 RX ORDER — SENNA PLUS 8.6 MG/1
1 TABLET ORAL
Refills: 0 | Status: DISCONTINUED | OUTPATIENT
Start: 2020-10-20 | End: 2020-10-23

## 2020-10-20 RX ORDER — NALOXONE HYDROCHLORIDE 4 MG/.1ML
0.1 SPRAY NASAL
Refills: 0 | Status: DISCONTINUED | OUTPATIENT
Start: 2020-10-20 | End: 2020-10-21

## 2020-10-20 RX ADMIN — Medication 30 MILLIGRAM(S): at 11:15

## 2020-10-20 RX ADMIN — FAMOTIDINE 20 MILLIGRAM(S): 10 INJECTION INTRAVENOUS at 07:39

## 2020-10-20 RX ADMIN — Medication 10 MILLIGRAM(S): at 07:39

## 2020-10-20 RX ADMIN — HEPARIN SODIUM 5000 UNIT(S): 5000 INJECTION INTRAVENOUS; SUBCUTANEOUS at 19:00

## 2020-10-20 RX ADMIN — SODIUM CHLORIDE 75 MILLILITER(S): 9 INJECTION, SOLUTION INTRAVENOUS at 11:06

## 2020-10-20 RX ADMIN — Medication 1000 MILLIUNIT(S)/MIN: at 11:06

## 2020-10-20 RX ADMIN — HYDROMORPHONE HYDROCHLORIDE 1 MILLIGRAM(S): 2 INJECTION INTRAMUSCULAR; INTRAVENOUS; SUBCUTANEOUS at 12:16

## 2020-10-20 RX ADMIN — Medication 30 MILLILITER(S): at 07:39

## 2020-10-20 RX ADMIN — Medication 30 MILLIGRAM(S): at 18:41

## 2020-10-20 RX ADMIN — SODIUM CHLORIDE 125 MILLILITER(S): 9 INJECTION, SOLUTION INTRAVENOUS at 07:40

## 2020-10-20 RX ADMIN — SODIUM CHLORIDE 2000 MILLILITER(S): 9 INJECTION, SOLUTION INTRAVENOUS at 07:40

## 2020-10-20 RX ADMIN — Medication 30 MILLIGRAM(S): at 18:11

## 2020-10-20 RX ADMIN — HYDROMORPHONE HYDROCHLORIDE 1 MILLIGRAM(S): 2 INJECTION INTRAMUSCULAR; INTRAVENOUS; SUBCUTANEOUS at 12:30

## 2020-10-20 RX ADMIN — Medication 30 MILLIGRAM(S): at 11:01

## 2020-10-20 NOTE — OB PROVIDER H&P - ASSESSMENT
34 yo , EGA#36.3 weeks presented for scheduled repeat  section.  Prenatal course is significant for receiving Sheree weekly until 35 weeks due to the history of  delivery.  Patient was evaluated for  contractions at 29 weeks, not admitted and at 33 weeks, received a course of betamethasone.  OB hx: ,  at 37 weeks, [id number], uncomplicated            2016, classical  section at 24 weeks, admitted for 15 days prior delivery with bleeding, PPROM.  Med hx: denies  Surg hx: , ovarian cystectomy               , abdominal cyst removed/incison revision (?)  GYN hx: PCOS  Meds: PNV, [place], vit B  Allergy: NKDA  Upon admission, vital signs stable, afebrile; NST is in progress, not in labor  A: 34 yo , EGA@36.3 weeks, scheduled  section  P: admitted for  section, routine labs; actions explained to patient and FOB.

## 2020-10-20 NOTE — OB NEONATOLOGY/PEDIATRICIAN DELIVERY SUMMARY - NSPEDSNEONOTESA_OBGYN_ALL_OB_FT
Baby boy born at 36.3wks via CS to a 37y/o  O+ blood type mother. Maternal history of Postpartum depression requiring medications. No significant prenatal history. PNL nr/immune/-, GBS + on 10/15. ROM at delivery with clear fluids. Baby emerged vigorous, crying, was w/d/s/s with APGARS of 8/8. Blow-by oxygen was initially given for respiratory support at 30% FiO2, and then CPAP 6 was initiated for 5 minutes before O2 saturations improved and stabilized. Mom would like to breastfeed, consents Hep B and consents circ. EOS 0.06. Baby boy born at 36.3wks via CS to a 35y/o  O+ blood type mother. Maternal history of Postpartum depression requiring medications. No significant prenatal history. PNL nr/immune/-, GBS + on 10/15. ROM at delivery with clear fluids. Baby emerged vigorous, crying, was w/d/s/s with APGARS of 8/8. Blow-by oxygen was initially given for respiratory support at 30% FiO2, and then CPAP 6 was initiated for 5 minutes before O2 saturations improved and stabilized. Mom would like to breastfeed, consents Hep B and consents circ. EOS 0.06.    Gen: NAD; well-appearing  HEENT: NC/AT; AFOF; ears and nose clinically patent, normally set; no tags ; no cleft lip/palate, oropharynx clear  Skin: pink, warm, well-perfused, no rash  Resp: CTAB, even, non-labored breathing  Cardiac: RRR, normal S1/S2; no murmurs; 2+ femoral pulses b/l  Abd: soft, NT/ND; +BS; no HSM, no masses palpated; umbilicus c/d/I, 3 vessels  Back: spine straight, no dimples or melvin  Extremities: FROM; no crepitus; negative O/B  : Bertrand I; no abnormalities; no hernia; anus patent  Neuro: normal tone; + Sascha, suck, grasp Baby boy born at 36.3wks via CS to a 35y/o  O+ blood type mother. Maternal history of Postpartum depression requiring medications. Mother required betamethasone at 33 weeks. PNL nr/immune/-, GBS + on 10/15. ROM at delivery with clear fluids. Baby emerged vigorous, crying, was w/d/s/s with APGARS of 8/8. Blow-by oxygen was initially given for respiratory support at 30% FiO2, and then CPAP 6 was initiated for 5 minutes before O2 saturations improved and stabilized. Mom would like to breastfeed, consents Hep B and consents circ. EOS 0.06.    Gen: NAD; well-appearing  HEENT: NC/AT; AFOF; ears and nose clinically patent, normally set; no tags ; no cleft lip/palate, oropharynx clear  Skin: pink, warm, well-perfused, no rash  Resp: CTAB, even, non-labored breathing  Cardiac: RRR, normal S1/S2; no murmurs; 2+ femoral pulses b/l  Abd: soft, NT/ND; +BS; no HSM, no masses palpated; umbilicus c/d/I, 3 vessels  Back: spine straight, no dimples or melvin  Extremities: FROM; no crepitus; negative O/B  : Bertrand I; no abnormalities; no hernia; anus patent  Neuro: normal tone; + Sascha, suck, grasp

## 2020-10-20 NOTE — OB PROVIDER DELIVERY SUMMARY - AMNIOTIC FLUID ODOR, LABOR
normal
hand off given to oncoming RN Shavonne Morris. Pt awaiting eval by Er Dr. QURESHI intact left wrist without sx of infilt

## 2020-10-20 NOTE — OB RN DELIVERY SUMMARY - NS_SEPSISRSKCALC_OBGYN_ALL_OB_FT
EOS calculated successfully. EOS Risk Factor: 0.5/1000 live births ([facility][note 3] national incidence); GA=36w3d; Temp=97.9; ROM=0.017; GBS='Positive'; Antibiotics='No antibiotics or any antibiotics < 2 hrs prior to birth'

## 2020-10-20 NOTE — OB RN INTRAOPERATIVE NOTE - NS_NEWBORNRN1_OBGYN_ALL_OB_FT
AIDAN ocasio Mucosal Advancement Flap Text: Given the location of the defect, shape of the defect and the proximity to free margins a mucosal advancement flap was deemed most appropriate. Incisions were made with a 15 blade scalpel in the appropriate fashion along the cutaneous vermilion border and the mucosal lip. The remaining actinically damaged mucosal tissue was excised.  The mucosal advancement flap was then elevated to the gingival sulcus with care taken to preserve the neurovascular structures and advanced into the primary defect. Care was taken to ensure that precise realignment of the vermilion border was achieved.

## 2020-10-20 NOTE — OB PROVIDER H&P - HISTORY OF PRESENT ILLNESS
35 y.o, , EGA@36.3 weeks scheduled for repeat  section.  Patient denies contractions/leaking fluid/vaginal bleeding, reports positive fetal movements.  Patient was on [place] weekly until 35 weeks gestation due to OB history.  Patient was evaluated for  contractions at 29 weeks, and at 33 weeks, s/p Betamethasone.

## 2020-10-20 NOTE — BRIEF OPERATIVE NOTE - OPERATION/FINDINGS
Viable male infant, apgar 8/8, weight: 2yim8kg; 2385 g delivered @09:23 am  cephalic presentation, clear copious fluid, noted  hysterotomy closed in single layer; otherwise grossly nl uterus, tubes & ovaries  peritoneum, rectus layers reapproximated;, fascia reapproximated in a singe layer; subcutaneous layer reapproximated in interrupted fashion; skin closed subcuticular fashion, using Monocryl suture.    QBL: 709 g  UOP: 500 cc  IVF: 2000 cc  Dictation Number: Viable male infant, apgar 8/8, weight: 8det1hh; 2385 g delivered @09:23 am  cephalic presentation, clear copious fluid, noted  hysterotomy closed in single layer; otherwise grossly nl uterus, tubes & ovaries  peritoneum, rectus layers reapproximated;, fascia reapproximated in a singe layer; subcutaneous layer reapproximated in interrupted fashion; skin closed subcuticular fashion, using Monocryl suture.    QBL: 709 g  UOP: 500 cc  IVF: 2000 cc  Dictation Number: [id number]

## 2020-10-20 NOTE — BRIEF OPERATIVE NOTE - IV INFUSIONS - CRYSTALLOIDS
DATE OF VISIT:      DIAGNOSIS CODE:  L73.2.      HISTORY OF PRESENT ILLNESS:  The patient is a 52-year-old male, who has been a   wound center patient over the last few months.  He had previously had a   resection of hidradenitis done at [facility] in the winter.  He had multiple   other recurrent areas that we debrided in his left groin region as well as   underneath the scrotum.  He has had open wound since the surgery which was   done in June.  He has been undergoing dressing changes and performed them by   himself using Aquacel.  These  wounds are very hypergranular throughout.  He   has pain that is 1/10 to the area.  He has been washing the area out and   performing the dressings on a daily basis.  He has been having issues getting   supplies so he has been using probably enough material to pack the wounds in   that was recommended.  His he has no other active areas noted, but did have a   small lesion pop up in the pubic area, but no other new areas are seen.      PHYSICAL EXAMINATION:   GENERAL:  Shows that he is alert and oriented, in no apparent distress.   VITAL SIGNS:  Temperature 96.9, pulse 96, respirations 20, blood pressure   119/73.   NECK:  Supple.   LUNGS:  Clear to auscultation.   HEART:  Regular rate and rhythm.   ABDOMEN:  Soft and benign.   EXTREMITIES:  Showed no cyanosis, clubbing, or edema.  SKIN:  Shows scarring   to the axilla, thigh on the right and groin areas on the right.  He does have   a wound present to his left groin that measures 7 x 1.5 x 0.4 cm that is   hypergranular throughout the length of the wound.  He has a wound beneath   this.  It measures 1.5 x 0.5 cm and is also hypergranular.  He has a wound   present over the scrotal area that is 4 x 0.5 cm with most of the wounds   closed.  He has a small right suprapubic area that is very minimal in size,   but just appears to be a new area of hidradenitis, its opening up.  All the   other areas have closed and have just  scar tissue.  Neurologic is intact with   strength 5/5 throughout.      ASSESSMENT/PLAN:  The patient does have a history of hidradenitis, has   undergone resection in several different areas, one back in November and the   last one back in May.  The wounds are open and granulating.  He would need   silver nitrate to the area once again, as they are actually hypergranular   coming outside the wound.  I did apply silver nitrate to all of the wounds   from the previous surgery.  They are open.  He had tolerated well.  Pain was   2/10 during the procedure and 0/10 afterwards.  I would dress the wounds with   Hydrofera Blue and a gauze to keep things in place with mesh panties.   He   will be able to do his own dressings and should change this every 2-3 days.   The wound should be kept dry as possible in between his dressing change.  I   will see him back in 2 weeks for followup to see if he needs more silver   nitrate, which I suspect he will and hopefully we can finally get the wounds   get started to allow them to close in.  We did give him a supply for his   dressings and we will try to help him order some new mesh panties.                  Michael Romberg, MD MR/Barbara     DD:  08/10/2017 16:14:46 / DT:  08/11/2017 03:13:18     Job #:   [id number]         2000 cc

## 2020-10-20 NOTE — OB RN PATIENT PROFILE - ALERT: PERTINENT HISTORY
Follow up Sonogram for Growth/1st Trimester Sonogram/20 Week Level II Sonogram/Non Invasive Prenatal Screen (NIPS)/Fetal Non-Stress Test (NST)/Ultra Screen at 12 Weeks

## 2020-10-20 NOTE — OB PROVIDER DELIVERY SUMMARY - NSPROVIDERDELIVERYNOTE_OBGYN_ALL_OB_FT
Viable male infant, apgar 8/8, weight: 2sxx8ns, 2385 g delivered @ 09:23 am  cephalic presentation, clear copious fluid; noted  hysterotomy closed in single layer;   otherwise grossly nl uterus, tubes & ovaries    QBL: 709 cc  UOP: 500 cc  IVF: 2000 cc  Dictation Number: Viable male infant, apgar 8/8, weight: 8rcx0tz, 2385 g delivered @ 09:23 am  cephalic presentation, clear copious fluid; noted  hysterotomy closed in single layer;   otherwise grossly nl uterus, tubes & ovaries    QBL: 709 cc  UOP: 500 cc  IVF: 2000 cc  Dictation Number: [id number]

## 2020-10-21 LAB
BASOPHILS # BLD AUTO: 0.03 K/UL — SIGNIFICANT CHANGE UP (ref 0–0.2)
BASOPHILS # BLD AUTO: 0.04 K/UL — SIGNIFICANT CHANGE UP (ref 0–0.2)
BASOPHILS NFR BLD AUTO: 0.3 % — SIGNIFICANT CHANGE UP (ref 0–2)
BASOPHILS NFR BLD AUTO: 0.4 % — SIGNIFICANT CHANGE UP (ref 0–2)
EOSINOPHIL # BLD AUTO: 0.16 K/UL — SIGNIFICANT CHANGE UP (ref 0–0.5)
EOSINOPHIL # BLD AUTO: 0.21 K/UL — SIGNIFICANT CHANGE UP (ref 0–0.5)
EOSINOPHIL NFR BLD AUTO: 1.3 % — SIGNIFICANT CHANGE UP (ref 0–6)
EOSINOPHIL NFR BLD AUTO: 1.9 % — SIGNIFICANT CHANGE UP (ref 0–6)
HCT VFR BLD CALC: 27.2 % — LOW (ref 34.5–45)
HCT VFR BLD CALC: 27.6 % — LOW (ref 34.5–45)
HGB BLD-MCNC: 8.8 G/DL — LOW (ref 11.5–15.5)
HGB BLD-MCNC: 8.9 G/DL — LOW (ref 11.5–15.5)
IMM GRANULOCYTES NFR BLD AUTO: 0.9 % — SIGNIFICANT CHANGE UP (ref 0–1.5)
IMM GRANULOCYTES NFR BLD AUTO: 1 % — SIGNIFICANT CHANGE UP (ref 0–1.5)
LYMPHOCYTES # BLD AUTO: 17.9 % — SIGNIFICANT CHANGE UP (ref 13–44)
LYMPHOCYTES # BLD AUTO: 18.3 % — SIGNIFICANT CHANGE UP (ref 13–44)
LYMPHOCYTES # BLD AUTO: 2.07 K/UL — SIGNIFICANT CHANGE UP (ref 1–3.3)
LYMPHOCYTES # BLD AUTO: 2.14 K/UL — SIGNIFICANT CHANGE UP (ref 1–3.3)
MCHC RBC-ENTMCNC: 27.6 PG — SIGNIFICANT CHANGE UP (ref 27–34)
MCHC RBC-ENTMCNC: 28.2 PG — SIGNIFICANT CHANGE UP (ref 27–34)
MCHC RBC-ENTMCNC: 32.2 % — SIGNIFICANT CHANGE UP (ref 32–36)
MCHC RBC-ENTMCNC: 32.4 % — SIGNIFICANT CHANGE UP (ref 32–36)
MCV RBC AUTO: 85.4 FL — SIGNIFICANT CHANGE UP (ref 80–100)
MCV RBC AUTO: 87.2 FL — SIGNIFICANT CHANGE UP (ref 80–100)
MONOCYTES # BLD AUTO: 1.24 K/UL — HIGH (ref 0–0.9)
MONOCYTES # BLD AUTO: 1.42 K/UL — HIGH (ref 0–0.9)
MONOCYTES NFR BLD AUTO: 11 % — SIGNIFICANT CHANGE UP (ref 2–14)
MONOCYTES NFR BLD AUTO: 11.9 % — SIGNIFICANT CHANGE UP (ref 2–14)
NEUTROPHILS # BLD AUTO: 7.64 K/UL — HIGH (ref 1.8–7.4)
NEUTROPHILS # BLD AUTO: 8.11 K/UL — HIGH (ref 1.8–7.4)
NEUTROPHILS NFR BLD AUTO: 67.4 % — SIGNIFICANT CHANGE UP (ref 43–77)
NEUTROPHILS NFR BLD AUTO: 67.7 % — SIGNIFICANT CHANGE UP (ref 43–77)
NRBC # FLD: 0 K/UL — SIGNIFICANT CHANGE UP (ref 0–0)
NRBC # FLD: 0 K/UL — SIGNIFICANT CHANGE UP (ref 0–0)
PLATELET # BLD AUTO: 195 K/UL — SIGNIFICANT CHANGE UP (ref 150–400)
PLATELET # BLD AUTO: 197 K/UL — SIGNIFICANT CHANGE UP (ref 150–400)
PMV BLD: 10.2 FL — SIGNIFICANT CHANGE UP (ref 7–13)
PMV BLD: 11.1 FL — SIGNIFICANT CHANGE UP (ref 7–13)
RBC # BLD: 3.12 M/UL — LOW (ref 3.8–5.2)
RBC # BLD: 3.23 M/UL — LOW (ref 3.8–5.2)
RBC # FLD: 16.3 % — HIGH (ref 10.3–14.5)
RBC # FLD: 16.3 % — HIGH (ref 10.3–14.5)
WBC # BLD: 11.31 K/UL — HIGH (ref 3.8–10.5)
WBC # BLD: 11.97 K/UL — HIGH (ref 3.8–10.5)
WBC # FLD AUTO: 11.31 K/UL — HIGH (ref 3.8–10.5)
WBC # FLD AUTO: 11.97 K/UL — HIGH (ref 3.8–10.5)

## 2020-10-21 RX ORDER — IBUPROFEN 200 MG
1 TABLET ORAL
Qty: 0 | Refills: 0 | DISCHARGE
Start: 2020-10-21

## 2020-10-21 RX ORDER — IBUPROFEN 200 MG
600 TABLET ORAL EVERY 6 HOURS
Refills: 0 | Status: DISCONTINUED | OUTPATIENT
Start: 2020-10-21 | End: 2020-10-23

## 2020-10-21 RX ORDER — FERROUS SULFATE 325(65) MG
325 TABLET ORAL THREE TIMES A DAY
Refills: 0 | Status: DISCONTINUED | OUTPATIENT
Start: 2020-10-21 | End: 2020-10-23

## 2020-10-21 RX ORDER — ACETAMINOPHEN 500 MG
3 TABLET ORAL
Qty: 0 | Refills: 0 | DISCHARGE
Start: 2020-10-21

## 2020-10-21 RX ORDER — ASCORBIC ACID 60 MG
500 TABLET,CHEWABLE ORAL DAILY
Refills: 0 | Status: DISCONTINUED | OUTPATIENT
Start: 2020-10-21 | End: 2020-10-23

## 2020-10-21 RX ADMIN — Medication 975 MILLIGRAM(S): at 21:21

## 2020-10-21 RX ADMIN — HEPARIN SODIUM 5000 UNIT(S): 5000 INJECTION INTRAVENOUS; SUBCUTANEOUS at 17:44

## 2020-10-21 RX ADMIN — Medication 500 MILLIGRAM(S): at 12:52

## 2020-10-21 RX ADMIN — SIMETHICONE 80 MILLIGRAM(S): 80 TABLET, CHEWABLE ORAL at 21:21

## 2020-10-21 RX ADMIN — Medication 975 MILLIGRAM(S): at 22:20

## 2020-10-21 RX ADMIN — HEPARIN SODIUM 5000 UNIT(S): 5000 INJECTION INTRAVENOUS; SUBCUTANEOUS at 05:35

## 2020-10-21 RX ADMIN — Medication 325 MILLIGRAM(S): at 21:21

## 2020-10-21 RX ADMIN — Medication 600 MILLIGRAM(S): at 17:44

## 2020-10-21 RX ADMIN — Medication 600 MILLIGRAM(S): at 12:52

## 2020-10-21 RX ADMIN — MAGNESIUM HYDROXIDE 30 MILLILITER(S): 400 TABLET, CHEWABLE ORAL at 21:21

## 2020-10-21 RX ADMIN — Medication 600 MILLIGRAM(S): at 13:35

## 2020-10-21 RX ADMIN — Medication 30 MILLIGRAM(S): at 05:57

## 2020-10-21 RX ADMIN — Medication 30 MILLIGRAM(S): at 05:42

## 2020-10-21 RX ADMIN — Medication 1 TABLET(S): at 12:52

## 2020-10-21 RX ADMIN — Medication 600 MILLIGRAM(S): at 18:29

## 2020-10-21 RX ADMIN — Medication 325 MILLIGRAM(S): at 12:52

## 2020-10-21 NOTE — PROGRESS NOTE ADULT - SUBJECTIVE AND OBJECTIVE BOX
ANESTHESIA POSTOP CHECK    35y Female POSTOP DAY 1     No COMPLAINTS    NO APPARENT ANESTHESIA COMPLICATIONS

## 2020-10-21 NOTE — PROGRESS NOTE ADULT - SUBJECTIVE AND OBJECTIVE BOX
OB Progress Note:  Delivery, POD#1    S: 34yo POD#1 s/p LTCS . Her pain is well controlled. She is tolerating a regular diet, voiding spontaneously, ambulating, and passing flatus. No headache, RUQ pain, or vision changes. Denies chest pain, SOB, dizziness, lightheadedness, n/v, fever/chills, or any other concerns. No heavy vaginal bleeding.     O:   Vital Signs Last 24 Hrs  T(C): 36.6 (21 Oct 2020 01:40), Max: 37 (20 Oct 2020 17:22)  T(F): 97.9 (21 Oct 2020 01:40), Max: 98.6 (20 Oct 2020 17:22)  HR: 86 (21 Oct 2020 01:40) (86 - 103)  BP: 88/54 (21 Oct 2020 01:40) (88/54 - 112/68)  BP(mean): 79 (20 Oct 2020 13:00) (66 - 80)  RR: 17 (21 Oct 2020 01:40) (13 - 22)  SpO2: 97% (21 Oct 2020 01:40) (96% - 100%)    PE:  General: NAD  Abdomen: soft, discomfort with palpation, bandage removed, incision c/d/i with steri strips  Extremities: No erythema, no pitting edema    Labs:  Blood type: O Positive  Rubella IgG: RPR: Negative                          12.5   9.11 >-----------< 233    ( 10-20 @ 06:44 )             39.6            A/P: 34yo POD#1 s/p rLTCS.  Patient is stable and doing well post-operatively.    - Continue regular diet.  - Increase ambulation.  - Continue motrin, tylenol, oxycodone PRN for pain control  - F/u AM CBC    Rosie Roe, PGY1

## 2020-10-21 NOTE — CHART NOTE - NSCHARTNOTEFT_GEN_A_CORE
34y/o  POD#1 @ repeat  section delivered 36w3d. Patient assessed at 1115 due to recent CBC results and BP 80-100s/50s. Patient reports headache this am that resolved with pain medication. Patient denies any blur vision, dizziness, shortness of breath, difficulties breathing and/or heart palpitations. Patient reports being anemic during pregnancy and was taking iron supplements. patient also reports having low blood pressures before pregnancy.    Vital Signs  Vital Signs Last 24 Hrs  T(C): 36.4 (21 Oct 2020 05:43), Max: 37 (20 Oct 2020 17:22)  T(F): 97.6 (21 Oct 2020 05:43), Max: 98.6 (20 Oct 2020 17:22)  HR: 81 (21 Oct 2020 05:54) (81 - 102)  BP: 92/50 (21 Oct 2020 10:04) (82/50 - 112/68)  BP(mean): 79 (20 Oct 2020 13:00) (74 - 80)  RR: 17 (21 Oct 2020 05:43) (13 - 22)  SpO2: 99% (21 Oct 2020 05:43) (96% - 100%)        LABS:                        8.8    11.97 )-----------( 197      ( 21 Oct 2020 05:57 )             27.2                 12.5  9.11)------------( 233       ( 20 Oct 2020 06:44)             39.6        MEDICATIONS  (STANDING):  acetaminophen   Tablet .. 975 milliGRAM(s) Oral <User Schedule>  ascorbic acid 500 milliGRAM(s) Oral daily  diphtheria/tetanus/pertussis (acellular) Vaccine (ADAcel) 0.5 milliLiter(s) IntraMuscular once  ferrous    sulfate 325 milliGRAM(s) Oral three times a day  heparin   Injectable 5000 Unit(s) SubCutaneous every 12 hours  ibuprofen  Tablet. 600 milliGRAM(s) Oral every 6 hours  influenza   Vaccine 0.5 milliLiter(s) IntraMuscular once  morphine PF Epidural 2 milliGRAM(s) Epidural once  prenatal multivitamin 1 Tablet(s) Oral daily    MEDICATIONS  (PRN):  diphenhydrAMINE 25 milliGRAM(s) Oral every 6 hours PRN Itching  diphenhydrAMINE   Injectable 25 milliGRAM(s) IV Push every 4 hours PRN Pruritus  HYDROmorphone  Injectable 1 milliGRAM(s) IV Push every 3 hours PRN Severe Pain (7 - 10)  lanolin Ointment 1 Application(s) Topical every 6 hours PRN Sore Nipples  magnesium hydroxide Suspension 30 milliLiter(s) Oral two times a day PRN Constipation  naloxone Injectable 0.1 milliGRAM(s) IV Push every 3 minutes PRN For ANY of the following changes in patient status:  A. RR LESS THAN 10 breaths per minute, B. Oxygen saturation LESS THAN 90%, C. Sedation score of 6  ondansetron Injectable 4 milliGRAM(s) IV Push every 6 hours PRN Nausea  oxyCODONE    IR 5 milliGRAM(s) Oral every 3 hours PRN Mild Pain (1 - 3)  oxyCODONE    IR 10 milliGRAM(s) Oral every 3 hours PRN Moderate Pain (4 - 6)  oxyCODONE    IR 5 milliGRAM(s) Oral every 3 hours PRN Moderate to Severe Pain (4-10)  oxyCODONE    IR 5 milliGRAM(s) Oral once PRN Moderate to Severe Pain (4-10)  senna 1 Tablet(s) Oral two times a day PRN Constipation  simethicone 80 milliGRAM(s) Chew every 4 hours PRN Gas      PE:  Alert and orientedx3. Lung sounds clear bilaterally, no distress noted. Apical heart rate 88, normal rhythm.  Breast soft, nontender nipples intact. Abdomen soft, nontender, nondistended. Fundus firm. Steri strips clean, dry and intact. Lochia light rubra. No edema and/or erythema noted to lower extremities equal bilaterally with no palpable veins noted, positive pedal pulses.         A/P: 34y/o  POD#1 @ repeat  section delivered 36w3d. with postpartum anemia s/p headache.          Problem#1: Postpartum anemia s/p headache.   As per Dr. Haley, repeat CBC 6 hours from am CBC.   Iron regimen ordered.   Discussed increase hydration and signs/symptoms to report due to anemia.   Will continue to monitor.

## 2020-10-22 ENCOUNTER — TRANSCRIPTION ENCOUNTER (OUTPATIENT)
Age: 35
End: 2020-10-22

## 2020-10-22 LAB
BASOPHILS # BLD AUTO: 0.03 K/UL — SIGNIFICANT CHANGE UP (ref 0–0.2)
BASOPHILS NFR BLD AUTO: 0.3 % — SIGNIFICANT CHANGE UP (ref 0–2)
EOSINOPHIL # BLD AUTO: 0.31 K/UL — SIGNIFICANT CHANGE UP (ref 0–0.5)
EOSINOPHIL NFR BLD AUTO: 3.3 % — SIGNIFICANT CHANGE UP (ref 0–6)
GLUCOSE BLDC GLUCOMTR-MCNC: 95 MG/DL — SIGNIFICANT CHANGE UP (ref 70–99)
HCT VFR BLD CALC: 26.8 % — LOW (ref 34.5–45)
HGB BLD-MCNC: 8.5 G/DL — LOW (ref 11.5–15.5)
IMM GRANULOCYTES NFR BLD AUTO: 1.3 % — SIGNIFICANT CHANGE UP (ref 0–1.5)
LYMPHOCYTES # BLD AUTO: 2.4 K/UL — SIGNIFICANT CHANGE UP (ref 1–3.3)
LYMPHOCYTES # BLD AUTO: 25.5 % — SIGNIFICANT CHANGE UP (ref 13–44)
MCHC RBC-ENTMCNC: 28.1 PG — SIGNIFICANT CHANGE UP (ref 27–34)
MCHC RBC-ENTMCNC: 31.7 % — LOW (ref 32–36)
MCV RBC AUTO: 88.7 FL — SIGNIFICANT CHANGE UP (ref 80–100)
MONOCYTES # BLD AUTO: 1.08 K/UL — HIGH (ref 0–0.9)
MONOCYTES NFR BLD AUTO: 11.5 % — SIGNIFICANT CHANGE UP (ref 2–14)
NEUTROPHILS # BLD AUTO: 5.46 K/UL — SIGNIFICANT CHANGE UP (ref 1.8–7.4)
NEUTROPHILS NFR BLD AUTO: 58.1 % — SIGNIFICANT CHANGE UP (ref 43–77)
NRBC # FLD: 0 K/UL — SIGNIFICANT CHANGE UP (ref 0–0)
PLATELET # BLD AUTO: 207 K/UL — SIGNIFICANT CHANGE UP (ref 150–400)
PMV BLD: 10.6 FL — SIGNIFICANT CHANGE UP (ref 7–13)
RBC # BLD: 3.02 M/UL — LOW (ref 3.8–5.2)
RBC # FLD: 16.3 % — HIGH (ref 10.3–14.5)
WBC # BLD: 9.4 K/UL — SIGNIFICANT CHANGE UP (ref 3.8–10.5)
WBC # FLD AUTO: 9.4 K/UL — SIGNIFICANT CHANGE UP (ref 3.8–10.5)

## 2020-10-22 RX ADMIN — Medication 600 MILLIGRAM(S): at 05:07

## 2020-10-22 RX ADMIN — Medication 975 MILLIGRAM(S): at 02:42

## 2020-10-22 RX ADMIN — SIMETHICONE 80 MILLIGRAM(S): 80 TABLET, CHEWABLE ORAL at 11:40

## 2020-10-22 RX ADMIN — HEPARIN SODIUM 5000 UNIT(S): 5000 INJECTION INTRAVENOUS; SUBCUTANEOUS at 17:13

## 2020-10-22 RX ADMIN — Medication 500 MILLIGRAM(S): at 11:39

## 2020-10-22 RX ADMIN — Medication 325 MILLIGRAM(S): at 11:40

## 2020-10-22 RX ADMIN — Medication 600 MILLIGRAM(S): at 18:12

## 2020-10-22 RX ADMIN — Medication 600 MILLIGRAM(S): at 11:39

## 2020-10-22 RX ADMIN — Medication 600 MILLIGRAM(S): at 06:05

## 2020-10-22 RX ADMIN — HEPARIN SODIUM 5000 UNIT(S): 5000 INJECTION INTRAVENOUS; SUBCUTANEOUS at 05:07

## 2020-10-22 RX ADMIN — Medication 1 TABLET(S): at 11:39

## 2020-10-22 RX ADMIN — Medication 325 MILLIGRAM(S): at 21:07

## 2020-10-22 RX ADMIN — Medication 600 MILLIGRAM(S): at 12:30

## 2020-10-22 RX ADMIN — Medication 600 MILLIGRAM(S): at 17:13

## 2020-10-22 RX ADMIN — Medication 325 MILLIGRAM(S): at 05:07

## 2020-10-22 RX ADMIN — Medication 975 MILLIGRAM(S): at 03:40

## 2020-10-22 NOTE — PROGRESS NOTE ADULT - ASSESSMENT
PE:  Lung sounds clear bilaterally. Normal heart rhythm. Apical heart rate 104. No distress noted  Breasts: appiah, nontender  Nipples: intact  Abdomen: Soft, nondistended and nontender. Bowel sounds present. Fundus firm  Abdominal incision: Clean, dry and intact with steri strips.   Vaginal: Lochia light rubra  Extremities: Slight edema noted bilaterally and equal to lower extremities, nontender with no erythremic areas noted. Positive pedal pulses. No palpable veins noted  Other relevant physical exam findings: none          Plan  A/P: 35y      Day#2   repeat post-operative  section delivery. Asymptomatic postpartum anemia with BPs 90s/50s-60s, HRs 90s-low 100s. Stable          Problem#1:  section delivery  Continue routine post-operative and postpartum care  Increase ambulation, analgesia PRN and pain medication protocol of standing ibuprofen and acetaminophen and oxycodone prn  Encouraged to wear abdominal binder for support and to use incentive spirometer  Discussed breast/nipple/engorgement care, breastfeeding and using breast pump.  Discharge to home today. Discussed discharge planning.       Problem#2: Asymptomatic postpartum anemia  Continue with iron supplementation and increase oral hydration.   Discussed signs and symptoms to report due to anemia.   Continue to monitor.       Discussed patient's history assessment, vitals, labs with Dr. Peña. Dr. Peña agrees with plan above and to monitor patient today with discharge in tomorrow am (10/23/20).   PE:  Lung sounds clear bilaterally. Normal heart rhythm. Apical heart rate 104. No distress noted  Breasts: appiah, nontender  Nipples: intact  Abdomen: Soft, nondistended and nontender. Bowel sounds present. Fundus firm  Abdominal incision: Clean, dry and intact with steri strips.   Vaginal: Lochia light rubra  Extremities: Slight edema noted bilaterally and equal to lower extremities, nontender with no erythremic areas noted. Positive pedal pulses. No palpable veins noted  Other relevant physical exam findings: none          Plan  A/P: 35y      Day#2   repeat post-operative  section delivery. Asymptomatic postpartum anemia with BPs 90s/50s-60s, HRs 90s-low 100s. RRT 10/22/20 @ 0210 for hypotension with shock index 1.27.          Problem#1:  section delivery  Continue routine post-operative and postpartum care  Increase ambulation, analgesia PRN and pain medication protocol of standing ibuprofen and acetaminophen and oxycodone prn  Encouraged to wear abdominal binder for support and to use incentive spirometer  Discussed breast/nipple/engorgement care, breastfeeding and using breast pump.  Discharge to home today. Discussed discharge planning.       Problem#2: Asymptomatic postpartum anemia  Continue with iron supplementation and increase oral hydration.   Discussed signs and symptoms to report due to anemia.   Continue to monitor.       Discussed patient's history, assessment, vitals, labs with Dr. Peña. Dr. Peña agrees with plan above and to monitor patient today with discharge in tomorrow am (10/23/20).

## 2020-10-22 NOTE — DISCHARGE NOTE OB - CARE PROVIDER_API CALL
Mika Dutta  MATERNAL/FETAL MEDICINE  [address] [place] [address]  Phone: [phone]  Fax: [phone]  Follow Up Time:

## 2020-10-22 NOTE — CHART NOTE - NSCHARTNOTEFT_GEN_A_CORE
Patient is 35y  old POD2 from scheduled repeat C/S, under general anesthesia.  .  Pt with BPs over the last 24 hours 80s-90s/50s.  Was evaluated yesterday morning due to drop in Hct to 27.2 and BP 90s/50s.  Pt was asymptomatic at that time and repeat Hct was stable at 27.6.      Pt was awoken from sleep for 2AM vitals and BP was noted to be 80/40 with .  Shock index of 1.27.  Rapid response called due to shock index.    On arrival to room, pt resting in bed.  Denies dizziness or lightheadedness.  Pain is controlled.  Repeat BP 92/52.      Vital Signs Last 24 Hrs  T(C): 37.1 (22 Oct 2020 02:05), Max: 37.1 (21 Oct 2020 21:59)  T(F): 98.7 (22 Oct 2020 02:05), Max: 98.8 (21 Oct 2020 21:59)  HR: 102 (22 Oct 2020 02:10) (81 - 102)  BP: 80/40 (22 Oct 2020 02:10) (80/40 - 97/57)  BP(mean): --  RR: 16 (22 Oct 2020 02:10) (16 - 18)  SpO2: 100% (22 Oct 2020 02:10) (95% - 100%)    I&O's Detail    20 Oct 2020 07:01  -  21 Oct 2020 07:00  --------------------------------------------------------  IN:    Lactated Ringers: 150 mL    Other (mL): 2000 mL    Oxytocin: 250 mL  Total IN: 2400 mL    OUT:    Estimated Blood Loss (mL): 709 mL    Indwelling Catheter - Urethral (mL): 1005 mL    Voided (mL): 600 mL  Total OUT: 2314 mL    Total NET: 86 mL          Labs:                        8.9    11.31 )-----------( 195      ( 21 Oct 2020 11:38 )             27.6                         8.8    11.97 )-----------( 197      ( 21 Oct 2020 05:57 )             27.2                         12.5   9.11  )-----------( 233      ( 20 Oct 2020 06:44 )             39.6             MEDICATIONS  (STANDING):  acetaminophen   Tablet .. 975 milliGRAM(s) Oral <User Schedule>  ascorbic acid 500 milliGRAM(s) Oral daily  diphtheria/tetanus/pertussis (acellular) Vaccine (ADAcel) 0.5 milliLiter(s) IntraMuscular once  ferrous    sulfate 325 milliGRAM(s) Oral three times a day  heparin   Injectable 5000 Unit(s) SubCutaneous every 12 hours  ibuprofen  Tablet. 600 milliGRAM(s) Oral every 6 hours  influenza   Vaccine 0.5 milliLiter(s) IntraMuscular once  prenatal multivitamin 1 Tablet(s) Oral daily      Evaluation :    NAD.  Abd: soft, ND, appropriately tender.  Fundus firm below umbilicus.  Perineum inspected and minimal lochia noted.        Pt s/p Rpt C/S POD2 with acute blood loss anemia, asymptomatic.    Repeat CBC.  Routine vitals.  Reviewed with pt symptoms of anemia, when to call [facility] del cid.    MD Elmer              -------------------------------------------------------------------------------------------------------------

## 2020-10-22 NOTE — CHART NOTE - NSCHARTNOTEFT_GEN_A_CORE
The patient is a 36 y/o  POD#1 @ repeat  section delivered 36w3d. On 10/22/2020, the nurse was obtaining routine vitals and found the patient to have a BP of 80/40, heart rate of 102 BPM, and a shock index of 1.27 so a RRT was called. Upon arrival, the patient was asymptomatic and had no signs of vaginal hemorrhage. The patient's blood pressure was repeated and improved to 92/52 with a heart rate of 99BPM with no interventions. The rapid was ended and the primary team ordered repeat labs which were stable. Will continue to follow.     ICU Vital Signs Last 24 Hrs  T(C): 36.7 (22 Oct 2020 05:19), Max: 37.1 (21 Oct 2020 21:59)  T(F): 98.1 (22 Oct 2020 05:19), Max: 98.8 (21 Oct 2020 21:59)  HR: 95 (22 Oct 2020 05:29) (92 - 102)  BP: 90/56 (22 Oct 2020 05:19) (80/40 - 97/60)  BP(mean): --  ABP: --  ABP(mean): --  RR: 17 (22 Oct 2020 05:19) (16 - 18)  SpO2: 100% (22 Oct 2020 05:19) (95% - 100%)                          8.5    9.40  )-----------( 207      ( 22 Oct 2020 03:00 )             26.8     Ave Adames PA-C   RRT #[id number]

## 2020-10-22 NOTE — DISCHARGE NOTE OB - PATIENT PORTAL LINK FT
You can access the FollowMyHealth Patient Portal offered by [facility] by registering at the following website: [email or web address]. By joining [facility]’s FollowMyHealth portal, you will also be able to view your health information using other applications (apps) compatible with our system.

## 2020-10-22 NOTE — DISCHARGE NOTE OB - MEDICATION SUMMARY - MEDICATIONS TO TAKE
I will START or STAY ON the medications listed below when I get home from the hospital:    acetaminophen 325 mg oral tablet  -- 3 tab(s) by mouth   -- Indication: For  delivery, delivered, current hospitalization    ibuprofen 600 mg oral tablet  -- 1 tab(s) by mouth every 6 hours  -- Indication: For  delivery, delivered, current hospitalization

## 2020-10-22 NOTE — PROGRESS NOTE ADULT - SUBJECTIVE AND OBJECTIVE BOX
Patient assessed at 0826.  Subjective  35y      POD#2    repeat post-operative  section delivery.  Medical/Surgical/OB/GYN History of vertical uterine incision,  (2014),  section (2015) @ 24weeks (PPROM admitted for 15days) s/p vertical incision, migraines secondary to epidural with  section, ovarian cystectomy, abdominal lipoma s/p excision 2018, Intermenstrual spotting due to intrauterine device. Patient denies any pain at the time of assessment. Pain being managed well by pain management protocol. Patient denies any headache, blur vision, dizziness, weakness/fatigue, shortness of breath, difficulties breathing and/or heart palpitations. Patient reports low BP before pregnancy (100s-90s/60s). Out of bed ambulating passing flatus, positive bowel movement, voiding without difficulties. .  Tolerating a regular diet. Patient mostly using breast pump.       Vitals  T(C): 36.7 (10-22-20 @ 05:19), Max: 37.1 (10-21-20 @ 21:59)  HR: 95 (10-22-20 @ 05:29) (92 - 102)  BP: 90/56 (10-22-20 @ 05:19) (80/40 - 97/60)  RR: 17 (10-22-20 @ 05:19) (16 - 18)  SpO2: 100% (10-22-20 @ 05:19) (95% - 100%)  Wt(kg): --                 LABS:                 8.5    9.40  )-----------( 207      ( 22 Oct 2020 03:00 )             26.8                  8.9    11.31 )-----------( 195      ( 21 Oct 2020 11:38 )             27.6                  8.8    11.97 )-----------( 197      ( 21 Oct 2020 05:57 )             27.2                 12.5  9.11)------------( 233       ( 20 Oct 2020 06:44)             39.6        Blood Type: O Positive    RPR: Negative    COVID-19 negative          MEDICATIONS  (STANDING):  acetaminophen   Tablet .. 975 milliGRAM(s) Oral <User Schedule>  ascorbic acid 500 milliGRAM(s) Oral daily  diphtheria/tetanus/pertussis (acellular) Vaccine (ADAcel) 0.5 milliLiter(s) IntraMuscular once  ferrous    sulfate 325 milliGRAM(s) Oral three times a day  heparin   Injectable 5000 Unit(s) SubCutaneous every 12 hours  ibuprofen  Tablet. 600 milliGRAM(s) Oral every 6 hours  influenza   Vaccine 0.5 milliLiter(s) IntraMuscular once  prenatal multivitamin 1 Tablet(s) Oral daily    MEDICATIONS  (PRN):  diphenhydrAMINE 25 milliGRAM(s) Oral every 6 hours PRN Itching  lanolin Ointment 1 Application(s) Topical every 6 hours PRN Sore Nipples  magnesium hydroxide Suspension 30 milliLiter(s) Oral two times a day PRN Constipation  oxyCODONE    IR 5 milliGRAM(s) Oral every 3 hours PRN Moderate to Severe Pain (4-10)  oxyCODONE    IR 5 milliGRAM(s) Oral once PRN Moderate to Severe Pain (4-10)  senna 1 Tablet(s) Oral two times a day PRN Constipation  simethicone 80 milliGRAM(s) Chew every 4 hours PRN Gas           Patient assessed at 0826.  Subjective  35y      POD#2    repeat post-operative  section delivery 36w3d.  Medical/Surgical/OB/GYN History of vertical uterine incision,  (2014),  section (2015) @ 24weeks (PPROM admitted for 15days) s/p vertical incision, migraines secondary to epidural with  section, ovarian cystectomy, abdominal lipoma s/p excision 2018, Intermenstrual spotting due to intrauterine device. Patient denies any pain at the time of assessment. Pain being managed well by pain management protocol. Patient denies any headache, blur vision, dizziness, weakness/fatigue, shortness of breath, difficulties breathing and/or heart palpitations. Patient reports low BP before pregnancy (100s-90s/60s). Out of bed ambulating passing flatus, positive bowel movement, voiding without difficulties. .  Tolerating a regular diet. Patient mostly using breast pump.       Vitals  T(C): 36.7 (10-22-20 @ 05:19), Max: 37.1 (10-21-20 @ 21:59)  HR: 95 (10-22-20 @ 05:29) (92 - 102)  BP: 90/56 (10-22-20 @ 05:19) (80/40 - 97/60)  RR: 17 (10-22-20 @ 05:19) (16 - 18)  SpO2: 100% (10-22-20 @ 05:19) (95% - 100%)  Wt(kg): --                 LABS:                 8.5    9.40  )-----------( 207      ( 22 Oct 2020 03:00 )             26.8                  8.9    11.31 )-----------( 195      ( 21 Oct 2020 11:38 )             27.6                  8.8    11.97 )-----------( 197      ( 21 Oct 2020 05:57 )             27.2                 12.5  9.11)------------( 233       ( 20 Oct 2020 06:44)             39.6        Blood Type: O Positive    RPR: Negative    COVID-19 negative          MEDICATIONS  (STANDING):  acetaminophen   Tablet .. 975 milliGRAM(s) Oral <User Schedule>  ascorbic acid 500 milliGRAM(s) Oral daily  diphtheria/tetanus/pertussis (acellular) Vaccine (ADAcel) 0.5 milliLiter(s) IntraMuscular once  ferrous    sulfate 325 milliGRAM(s) Oral three times a day  heparin   Injectable 5000 Unit(s) SubCutaneous every 12 hours  ibuprofen  Tablet. 600 milliGRAM(s) Oral every 6 hours  influenza   Vaccine 0.5 milliLiter(s) IntraMuscular once  prenatal multivitamin 1 Tablet(s) Oral daily    MEDICATIONS  (PRN):  diphenhydrAMINE 25 milliGRAM(s) Oral every 6 hours PRN Itching  lanolin Ointment 1 Application(s) Topical every 6 hours PRN Sore Nipples  magnesium hydroxide Suspension 30 milliLiter(s) Oral two times a day PRN Constipation  oxyCODONE    IR 5 milliGRAM(s) Oral every 3 hours PRN Moderate to Severe Pain (4-10)  oxyCODONE    IR 5 milliGRAM(s) Oral once PRN Moderate to Severe Pain (4-10)  senna 1 Tablet(s) Oral two times a day PRN Constipation  simethicone 80 milliGRAM(s) Chew every 4 hours PRN Gas

## 2020-10-22 NOTE — DISCHARGE NOTE OB - CARE PLAN
Principal Discharge DX:	 delivery, delivered, current hospitalization  Goal:	recovery  Assessment and plan of treatment:	oob, reg diet, analgesia PRN, pelvic rest

## 2020-10-23 VITALS
RESPIRATION RATE: 17 BRPM | DIASTOLIC BLOOD PRESSURE: 72 MMHG | HEART RATE: 100 BPM | OXYGEN SATURATION: 98 % | TEMPERATURE: 98 F | SYSTOLIC BLOOD PRESSURE: 110 MMHG

## 2020-10-23 RX ADMIN — Medication 600 MILLIGRAM(S): at 00:14

## 2020-10-23 RX ADMIN — HEPARIN SODIUM 5000 UNIT(S): 5000 INJECTION INTRAVENOUS; SUBCUTANEOUS at 06:24

## 2020-10-23 RX ADMIN — Medication 975 MILLIGRAM(S): at 07:00

## 2020-10-23 RX ADMIN — Medication 975 MILLIGRAM(S): at 06:29

## 2020-10-23 RX ADMIN — Medication 325 MILLIGRAM(S): at 06:24

## 2020-10-23 RX ADMIN — Medication 600 MILLIGRAM(S): at 00:45

## 2020-10-23 NOTE — PROGRESS NOTE ADULT - SUBJECTIVE AND OBJECTIVE BOX
SUBJECTIVE:    Pain: Controlled    Complaints: None    MILESTONES:    Alert and Oriented x 3  [ x ]  Out of bed/ ambulating. [ x ]  Flatus:   Positive [ x ]  Negative [  ]  Bowel movement  [  ] Positive [  ] Negative   Voiding [x  ] Due to void [  ]   Gupta/Indwelling catheter in place [  ]  Diet: Regular [ x ]  Clears [  ]  NPO [  ]    Infant feeding:  Breast [  ]   Bottle [  ]  Both [ X]  Feeding related issues and/or concerns:      OBJECTIVE:  T(C): 36.7 (10-23-20 @ 05:52), Max: 36.8 (10-22-20 @ 14:00)  HR: 89 (10-23-20 @ 05:52) (89 - 99)  BP: 101/65 (10-23-20 @ 05:52) (93/57 - 112/71)  RR: 18 (10-23-20 @ 05:52) (18 - 18)  SpO2: 100% (10-23-20 @ 05:52) (97% - 100%)  Wt(kg): --                        8.5    9.40  )-----------( 207      ( 22 Oct 2020 03:00 )             26.8           Blood Type: O Positive    RPR: Negative          MEDICATIONS  (STANDING):  acetaminophen   Tablet .. 975 milliGRAM(s) Oral <User Schedule>  ascorbic acid 500 milliGRAM(s) Oral daily  diphtheria/tetanus/pertussis (acellular) Vaccine (ADAcel) 0.5 milliLiter(s) IntraMuscular once  ferrous    sulfate 325 milliGRAM(s) Oral three times a day  heparin   Injectable 5000 Unit(s) SubCutaneous every 12 hours  ibuprofen  Tablet. 600 milliGRAM(s) Oral every 6 hours  prenatal multivitamin 1 Tablet(s) Oral daily    MEDICATIONS  (PRN):  diphenhydrAMINE 25 milliGRAM(s) Oral every 6 hours PRN Itching  lanolin Ointment 1 Application(s) Topical every 6 hours PRN Sore Nipples  magnesium hydroxide Suspension 30 milliLiter(s) Oral two times a day PRN Constipation  oxyCODONE    IR 5 milliGRAM(s) Oral every 3 hours PRN Moderate to Severe Pain (4-10)  oxyCODONE    IR 5 milliGRAM(s) Oral once PRN Moderate to Severe Pain (4-10)  senna 1 Tablet(s) Oral two times a day PRN Constipation  simethicone 80 milliGRAM(s) Chew every 4 hours PRN Gas        ASSESSMENT:    35y     G  3    P   2103      PO Day#  3        Delivery: Primary [  ]    Repeat [ X ]       EBL -709                                  Indication of procedure: Scheduled    Condition: Stable    Past Medical History significant for: HPI:  35 y.o, , EGA@36.3 weeks scheduled for repeat  section.  Patient denies contractions/leaking fluid/vaginal bleeding, reports positive fetal movements.  Patient was on Nekoma weekly until 35 weeks gestation due to OB history.  Patient was evaluated for  contractions at 29 weeks, and at 33 weeks, s/p Betamethasone.     (20 Oct 2020 06:46)      Current Issues:    Breasts:  Soft [x  ]   Engorged [  ]  Nipples:  Abdomen: Soft [ x ]   Distended [  ] Nontender [  ]     Bowel sounds :  Present [  ]  Absent [  ]   Fundus:  Firm [x  ]  Boggy [  ]    Abdominal incision: Clean, Dry and Intact [x  ]  Staples [  ] Steri Strips [ X ] Dermabond [  ] Sutures [  ]    Patient wearing abdominal binder for support.    Vaginal: Lochia:  Heavy [  ]  Moderate [ x ]   Scant [  ]    Extremities: Edema [  ] Negative Sheri's Sign [ X] Nontender Johnie  [ x ] Positive pedal pulses [  ]    Other relevant physical exam findings:  Patient with a bruise on her Right calf due to a muscle injury. She neglected to mention this to  at any time including yesterday. Dr. Dutta was called and made aware. Will have the covering Attending see the patient.  No c/o calf pain or tenderness.  No redness or swelling seen.   Patient stated that it was a result of a muscle injury that occurred when she stretched her legs, the muscle popped, about 3 weeks ago.      PLAN:    Plan: Increase ambulation, analgesia PRN and pain medication protocol standing oxycodone, ibuprofen and acetaminophen.    Diet: Regular diet    Continue routine post-operative and postpartum care.     Discharge Planning [ x ]    For discharge Today  [  X  ]    Consults:  Social Work [  ]  Lactation [ x ]  Other [         ]

## 2020-10-23 NOTE — PROGRESS NOTE ADULT - ATTENDING COMMENTS
Called to evaluate pt for bruise on leg. +healing bruise right calf - pt states occurred few weeks ago and is improving. No other complaints.  D/c home
Pt seen and examined and agree with above assessment and plan.

## 2020-11-09 ENCOUNTER — APPOINTMENT (OUTPATIENT)
Dept: OBGYN | Facility: CLINIC | Age: 35
End: 2020-11-09
Payer: MEDICAID

## 2020-11-09 PROCEDURE — 0503F POSTPARTUM CARE VISIT: CPT

## 2021-04-05 ENCOUNTER — APPOINTMENT (OUTPATIENT)
Dept: OBGYN | Facility: CLINIC | Age: 36
End: 2021-04-05
Payer: MEDICAID

## 2021-04-05 PROCEDURE — 99395 PREV VISIT EST AGE 18-39: CPT

## 2021-04-05 PROCEDURE — 99072 ADDL SUPL MATRL&STAF TM PHE: CPT

## 2021-05-24 ENCOUNTER — APPOINTMENT (OUTPATIENT)
Dept: OBGYN | Facility: CLINIC | Age: 36
End: 2021-05-24
Payer: MEDICAID

## 2021-05-24 ENCOUNTER — NON-APPOINTMENT (OUTPATIENT)
Age: 36
End: 2021-05-24

## 2021-05-24 PROCEDURE — 11981 INSERTION DRUG DLVR IMPLANT: CPT

## 2021-07-27 ENCOUNTER — EMERGENCY (EMERGENCY)
Facility: HOSPITAL | Age: 36
LOS: 0 days | Discharge: HOME | End: 2021-07-27
Attending: STUDENT IN AN ORGANIZED HEALTH CARE EDUCATION/TRAINING PROGRAM
Payer: COMMERCIAL

## 2021-07-27 VITALS
TEMPERATURE: 98 F | SYSTOLIC BLOOD PRESSURE: 108 MMHG | RESPIRATION RATE: 18 BRPM | DIASTOLIC BLOOD PRESSURE: 68 MMHG | OXYGEN SATURATION: 98 % | HEART RATE: 97 BPM

## 2021-07-27 VITALS
SYSTOLIC BLOOD PRESSURE: 102 MMHG | TEMPERATURE: 98 F | WEIGHT: 127.87 LBS | OXYGEN SATURATION: 97 % | HEART RATE: 125 BPM | HEIGHT: 64 IN | RESPIRATION RATE: 16 BRPM | DIASTOLIC BLOOD PRESSURE: 70 MMHG

## 2021-07-27 DIAGNOSIS — D17.1 BENIGN LIPOMATOUS NEOPLASM OF SKIN AND SUBCUTANEOUS TISSUE OF TRUNK: Chronic | ICD-10-CM

## 2021-07-27 DIAGNOSIS — Z98.89 OTHER SPECIFIED POSTPROCEDURAL STATES: Chronic | ICD-10-CM

## 2021-07-27 DIAGNOSIS — K52.9 NONINFECTIVE GASTROENTERITIS AND COLITIS, UNSPECIFIED: ICD-10-CM

## 2021-07-27 DIAGNOSIS — Z98.890 OTHER SPECIFIED POSTPROCEDURAL STATES: ICD-10-CM

## 2021-07-27 DIAGNOSIS — T83.83XA HEMORRHAGE DUE TO GENITOURINARY PROSTHETIC DEVICES, IMPLANTS AND GRAFTS, INITIAL ENCOUNTER: Chronic | ICD-10-CM

## 2021-07-27 DIAGNOSIS — D17.9 BENIGN LIPOMATOUS NEOPLASM, UNSPECIFIED: ICD-10-CM

## 2021-07-27 DIAGNOSIS — Z98.891 HISTORY OF UTERINE SCAR FROM PREVIOUS SURGERY: Chronic | ICD-10-CM

## 2021-07-27 LAB
ALBUMIN SERPL ELPH-MCNC: 3.7 G/DL — SIGNIFICANT CHANGE UP (ref 3.3–5)
ALP SERPL-CCNC: 109 U/L — SIGNIFICANT CHANGE UP (ref 40–120)
ALT FLD-CCNC: 25 U/L — SIGNIFICANT CHANGE UP (ref 12–78)
ANION GAP SERPL CALC-SCNC: 4 MMOL/L — LOW (ref 5–17)
APPEARANCE UR: CLEAR — SIGNIFICANT CHANGE UP
AST SERPL-CCNC: 23 U/L — SIGNIFICANT CHANGE UP (ref 15–37)
BACTERIA # UR AUTO: ABNORMAL
BASOPHILS # BLD AUTO: 0.02 K/UL — SIGNIFICANT CHANGE UP (ref 0–0.2)
BASOPHILS NFR BLD AUTO: 0.2 % — SIGNIFICANT CHANGE UP (ref 0–2)
BILIRUB SERPL-MCNC: 0.7 MG/DL — SIGNIFICANT CHANGE UP (ref 0.2–1.2)
BILIRUB UR-MCNC: NEGATIVE — SIGNIFICANT CHANGE UP
BUN SERPL-MCNC: 16 MG/DL — SIGNIFICANT CHANGE UP (ref 7–23)
CALCIUM SERPL-MCNC: 8.4 MG/DL — LOW (ref 8.5–10.1)
CHLORIDE SERPL-SCNC: 108 MMOL/L — SIGNIFICANT CHANGE UP (ref 96–108)
CO2 SERPL-SCNC: 25 MMOL/L — SIGNIFICANT CHANGE UP (ref 22–31)
COLOR SPEC: YELLOW — SIGNIFICANT CHANGE UP
CREAT SERPL-MCNC: 0.58 MG/DL — SIGNIFICANT CHANGE UP (ref 0.5–1.3)
DIFF PNL FLD: ABNORMAL
EOSINOPHIL # BLD AUTO: 0.13 K/UL — SIGNIFICANT CHANGE UP (ref 0–0.5)
EOSINOPHIL NFR BLD AUTO: 1 % — SIGNIFICANT CHANGE UP (ref 0–6)
GLUCOSE SERPL-MCNC: 76 MG/DL — SIGNIFICANT CHANGE UP (ref 70–99)
GLUCOSE UR QL: NEGATIVE MG/DL — SIGNIFICANT CHANGE UP
HCG SERPL-ACNC: <1 MIU/ML — SIGNIFICANT CHANGE UP
HCT VFR BLD CALC: 41.1 % — SIGNIFICANT CHANGE UP (ref 34.5–45)
HGB BLD-MCNC: 14.1 G/DL — SIGNIFICANT CHANGE UP (ref 11.5–15.5)
IMM GRANULOCYTES NFR BLD AUTO: 0.5 % — SIGNIFICANT CHANGE UP (ref 0–1.5)
KETONES UR-MCNC: ABNORMAL
LEUKOCYTE ESTERASE UR-ACNC: NEGATIVE — SIGNIFICANT CHANGE UP
LIDOCAIN IGE QN: 53 U/L — LOW (ref 73–393)
LYMPHOCYTES # BLD AUTO: 0.78 K/UL — LOW (ref 1–3.3)
LYMPHOCYTES # BLD AUTO: 5.9 % — LOW (ref 13–44)
MCHC RBC-ENTMCNC: 28.3 PG — SIGNIFICANT CHANGE UP (ref 27–34)
MCHC RBC-ENTMCNC: 34.3 GM/DL — SIGNIFICANT CHANGE UP (ref 32–36)
MCV RBC AUTO: 82.4 FL — SIGNIFICANT CHANGE UP (ref 80–100)
MONOCYTES # BLD AUTO: 0.63 K/UL — SIGNIFICANT CHANGE UP (ref 0–0.9)
MONOCYTES NFR BLD AUTO: 4.7 % — SIGNIFICANT CHANGE UP (ref 2–14)
NEUTROPHILS # BLD AUTO: 11.66 K/UL — HIGH (ref 1.8–7.4)
NEUTROPHILS NFR BLD AUTO: 87.7 % — HIGH (ref 43–77)
NITRITE UR-MCNC: NEGATIVE — SIGNIFICANT CHANGE UP
NRBC # BLD: 0 /100 WBCS — SIGNIFICANT CHANGE UP (ref 0–0)
PH UR: 5 — SIGNIFICANT CHANGE UP (ref 5–8)
PLATELET # BLD AUTO: 263 K/UL — SIGNIFICANT CHANGE UP (ref 150–400)
POTASSIUM SERPL-MCNC: 3.6 MMOL/L — SIGNIFICANT CHANGE UP (ref 3.5–5.3)
POTASSIUM SERPL-SCNC: 3.6 MMOL/L — SIGNIFICANT CHANGE UP (ref 3.5–5.3)
PROT SERPL-MCNC: 8 GM/DL — SIGNIFICANT CHANGE UP (ref 6–8.3)
PROT UR-MCNC: NEGATIVE MG/DL — SIGNIFICANT CHANGE UP
RBC # BLD: 4.99 M/UL — SIGNIFICANT CHANGE UP (ref 3.8–5.2)
RBC # FLD: 13.4 % — SIGNIFICANT CHANGE UP (ref 10.3–14.5)
RBC CASTS # UR COMP ASSIST: SIGNIFICANT CHANGE UP /HPF (ref 0–4)
SODIUM SERPL-SCNC: 137 MMOL/L — SIGNIFICANT CHANGE UP (ref 135–145)
SP GR SPEC: 1.01 — SIGNIFICANT CHANGE UP (ref 1.01–1.02)
UROBILINOGEN FLD QL: NEGATIVE MG/DL — SIGNIFICANT CHANGE UP
WBC # BLD: 13.28 K/UL — HIGH (ref 3.8–10.5)
WBC # FLD AUTO: 13.28 K/UL — HIGH (ref 3.8–10.5)
WBC UR QL: SIGNIFICANT CHANGE UP

## 2021-07-27 PROCEDURE — 99285 EMERGENCY DEPT VISIT HI MDM: CPT

## 2021-07-27 PROCEDURE — 71045 X-RAY EXAM CHEST 1 VIEW: CPT | Mod: 26

## 2021-07-27 PROCEDURE — 93010 ELECTROCARDIOGRAM REPORT: CPT

## 2021-07-27 RX ORDER — LIDOCAINE 4 G/100G
10 CREAM TOPICAL ONCE
Refills: 0 | Status: COMPLETED | OUTPATIENT
Start: 2021-07-27 | End: 2021-07-27

## 2021-07-27 RX ORDER — SODIUM CHLORIDE 9 MG/ML
1800 INJECTION, SOLUTION INTRAVENOUS ONCE
Refills: 0 | Status: COMPLETED | OUTPATIENT
Start: 2021-07-27 | End: 2021-07-27

## 2021-07-27 RX ORDER — ONDANSETRON 8 MG/1
1 TABLET, FILM COATED ORAL
Qty: 9 | Refills: 0
Start: 2021-07-27 | End: 2021-07-29

## 2021-07-27 RX ORDER — ONDANSETRON 8 MG/1
4 TABLET, FILM COATED ORAL ONCE
Refills: 0 | Status: COMPLETED | OUTPATIENT
Start: 2021-07-27 | End: 2021-07-27

## 2021-07-27 RX ORDER — FAMOTIDINE 10 MG/ML
20 INJECTION INTRAVENOUS ONCE
Refills: 0 | Status: COMPLETED | OUTPATIENT
Start: 2021-07-27 | End: 2021-07-27

## 2021-07-27 RX ADMIN — LIDOCAINE 10 MILLILITER(S): 4 CREAM TOPICAL at 16:57

## 2021-07-27 RX ADMIN — Medication 30 MILLILITER(S): at 16:58

## 2021-07-27 RX ADMIN — SODIUM CHLORIDE 1800 MILLILITER(S): 9 INJECTION, SOLUTION INTRAVENOUS at 16:54

## 2021-07-27 RX ADMIN — ONDANSETRON 4 MILLIGRAM(S): 8 TABLET, FILM COATED ORAL at 16:56

## 2021-07-27 RX ADMIN — FAMOTIDINE 20 MILLIGRAM(S): 10 INJECTION INTRAVENOUS at 16:56

## 2021-07-27 NOTE — ED PROVIDER NOTE - PHYSICAL EXAMINATION
GENERAL:  well appearing, non-toxic patient in no acute distress  SKIN: skin warm, pink and dry. MMM.   HEAD: NC, AT  EYE: Normal lids, conjunctiva and sclera, PERRL, EOMI  ENT:  Airway intact. Patent oropharynx without erythema or exudate. Uvula midline. TMs clear b/l.   NECK: Neck supple. No midline cervical tenderness, meningismus, or JVD. Trachea midline  PULM: CTAB. Normal respiratory effort. No respiratory distress. No wheezes, stridor, rales or rhonchi. No retractions  CV: RRR, no M/R/G.   ABD: Epigastric, LLQ, RLQ TTP without guarding.   MSK: FROM of all extremities.  No MSK tenderness. No edema, erythema, cyanosis. Distal pulses intact.  NEURO: A+Ox3, no sensory/motor deficits, CN II-XII intact. No speech slurring, pronator drift, facial asymmetry. Normal finger-to-nose  b/l. 5/5 strength throughout. Normal gait. Negative Romberg.  PSYCH: appropriate behavior, cooperative.

## 2021-07-27 NOTE — ED PROVIDER NOTE - PATIENT PORTAL LINK FT
You can access the FollowMyHealth Patient Portal offered by [facility] by registering at the following website: [email or web address]. By joining [employer]’s FollowMyHealth portal, you will also be able to view your health information using other applications (apps) compatible with our system.

## 2021-07-27 NOTE — ED ADULT NURSE NOTE - OBJECTIVE STATEMENT
A&Ox4, ambulating. c/o diarrhea x7 and generalized abdominal pain, cramping, constant, started this AM, no radiation, with vomiting x3, denies fevers/chills, stated recent sick contact with son who was diagnosis with stomach bug on Saturday. denies any falls/injuries. stated took Tylenol for cramps with no relief. pt reported unable to tolerate PO fluids/food. pt denies eating anything out of the norm.

## 2021-07-27 NOTE — ED PROVIDER NOTE - NSFOLLOWUPINSTRUCTIONS_ED_ALL_ED_FT
Take Zofran 4 mg ODT every 8 hours as needed for nausea and vomiting.    Take Pepcid (famotidine) 40mg daily at night.  Take Maalox 20mL up to 4 times daily.     Gastritis    Gastritis is soreness and swelling (inflammation) of the lining of the stomach. Gastritis can develop as a sudden onset (acute) or long-term (chronic) condition. If gastritis is not treated, it can lead to stomach bleeding and ulcers. Causes include viral and bacterial infections, excessive alcohol consumption, tobacco use, or certain medications. Symptoms include nausea, vomiting, or abdominal pain or burning especially after eating. Avoid foods or drinks that make your symptoms worse such as caffeine, chocolate, spicy foods, acidic foods, or alcohol.    SEEK IMMEDIATE MEDICAL CARE IF YOU HAVE ANY OF THE FOLLOWING SYMPTOMS: black or bloody stools, blood or coffee-ground-colored vomitus, worsening abdominal pain, fever, or inability to keep fluids down.

## 2021-07-27 NOTE — ED ADULT TRIAGE NOTE - RESPIRATORY RATE (BREATHS/MIN)
NEPHROLOGY PROGRESS NOTE   Diogenes Kern 79 y o  male MRN: [id number]  Unit/Bed#: ICU 13 Encounter: [id number]  Reason for Consult: JOSE E    ASSESSMENT/PLAN:  1  Acute kidney injury, suspecting ATN, patient remains nonoliguric  2  Hematuria, likely secondary to Ruvalcaba catheter, removed Ruvalcaba catheter today  3  Recent sepsis secondary to Klebsiella and E coli  4  Cholelithiasis with cholangitis, status post ERCP with stent placement   5  Anemia, continue to monitor    PLAN:  · Overall renal function seems to have plateaued, patient remains nonoliguric  · Continue with gentle IV fluids  · Hopeful continued improvement renal function  · Okay to remove Ruvalcaba catheter  · Will check complement levels, ANCA given recent sepsis as well as hematuria and renal failure    SUBJECTIVE:  Noted  Patient awake alert  Appears confused at times  Appetite remains poor  No further diarrhea  Denies any chest pain or shortness of Breath  Review of Systems    OBJECTIVE:  Current Weight: Weight - Scale: 74 3 kg (163 lb 12 8 oz)  Vitals:    09/24/18 0033 09/24/18 0215 09/24/18 0900 09/24/18 0909   BP: (!) 187/78 (!) 104/49 141/52    Pulse: 74 84 68    Resp: 14 13 (!) 11    Temp:    98 2 °F (36 8 °C)   TempSrc:    Probe   SpO2:    95%   Weight:       Height:           Intake/Output Summary (Last 24 hours) at 09/24/18 1011  Last data filed at 09/24/18 0801   Gross per 24 hour   Intake             2025 ml   Output             2150 ml   Net             -125 ml       Physical Exam   Constitutional: He is oriented to person, place, and time  No distress  HENT:   Head: Normocephalic  Eyes: No scleral icterus  Neck: Neck supple  Cardiovascular: Normal rate and regular rhythm  Pulmonary/Chest: Effort normal and breath sounds normal    Abdominal: Soft  He exhibits no distension  Musculoskeletal: He exhibits no edema  Neurological: He is alert and oriented to person, place, and time  Skin: Skin is warm and dry     Psychiatric: He has a normal mood and affect         Medications:    Current Facility-Administered Medications:     ceFAZolin (ANCEF) IVPB (premix) 1,000 mg, 1,000 mg, Intravenous, Q24H, BROOKLYN Barrera, Stopped at 09/23/18 1401    hydrALAZINE (APRESOLINE) injection 10 mg, 10 mg, Intravenous, Q6H PRN, BROOKLYN Barrera, 10 mg at 09/24/18 0037    ipratropium (ATROVENT) 0 02 % inhalation solution 0 5 mg, 0 5 mg, Nebulization, Q8H PRN, Junior Sequeira MD, 0 5 mg at 09/20/18 0913    levalbuterol (Dondra ) inhalation solution 1 25 mg, 1 25 mg, Nebulization, Q8H PRN, Lorence FlickMILAGROSNP, 1 25 mg at 09/20/18 0913    metoprolol (LOPRESSOR) injection 5 mg, 5 mg, Intravenous, Q6H PRN, BROOKLYN Miranda, 5 mg at 09/20/18 0813    metoprolol tartrate (LOPRESSOR) tablet 25 mg, 25 mg, Oral, Q12H [address], KarlaBROOKLYN Goncalves, 25 mg at 09/24/18 0901    multi-electrolyte (ISOLYTE-S PH 7 4 equivalent) IV solution, 75 mL/hr, Intravenous, Continuous, Maryan Fuentes PA-C, Last Rate: 75 mL/hr at 09/24/18 0920, 75 mL/hr at 09/24/18 0920    ondansetron (ZOFRAN) injection 4 mg, 4 mg, Intravenous, Q4H PRN, Lorence Flick, CRNP, 4 mg at 09/21/18 0117    vancomycin (VANCOCIN) oral solution 500 mg, 500 mg, Oral, Q6H [address], Dayan Tyler PA-C, 500 mg at 09/24/18 0536    Laboratory Results:    Results from last 7 days  Lab Units 09/24/18  0458 09/23/18  0438 09/22/18  0428 09/21/18  0446 09/20/18  1145 09/20/18  0755 09/20/18  0543 09/19/18  0456 09/18/18  0442 09/18/18  0441  09/17/18  1836 09/17/18  1546   WBC Thousand/uL 11 92*  --   --  10 77*  --   --  14 27* 19 94*  --  11 98*  --   --  11 96*   HEMOGLOBIN g/dL 9 0*  --   --  10 4*  --   --  9 6* 9 3*  --  12 0  --   --  11 3*   I STAT HEMOGLOBIN g/dl  --   --   --   --   --   --   --   --   --   --   --  10 2*  --    HEMATOCRIT % 25 8*  --   --  30 2*  --   --  27 4* 26 8*  --  36 7  --  30* 34 9*   PLATELETS Thousands/uL 50*  --   --  34*  --  10* 9* 29*  --  11*  --   --  44*   SODIUM mmol/L 140 140 142 142 143  --  143 142 141  --   < >  --  139   POTASSIUM mmol/L 3 4* 3 6 4 0 4 3 3 4*  --  4 0 3 9 3 8  --   < >  --  4 2   CHLORIDE mmol/L 100 101 104 108 107  --  109* 108 107  --   < >  --  106   CO2 mmol/L 28 27 25 19* 20*  --  20* 20* 20*  --   < >  --  12*   BUN mg/dL 96* 98* 98* 94* 87*  --  83* 74* 55*  --   < >  --  51*   CREATININE mg/dL 6 11* 6 21* 5 99* 5 53* 5 14*  --  4 84* 3 96* 3 15*  --   < >  --  3 09*   CALCIUM mg/dL 7 8* 7 8* 7 8* 8 2* 8 0*  --  7 8* 7 6* 7 8*  --   < >  --  7 9*   MAGNESIUM mg/dL  --   --   --   --   --   --   --  2 1 1 5*  --   --   --   --    PHOSPHORUS mg/dL  --   --   --   --   --   --   --   --  4 8*  --   --   --   --    GLUCOSE, ISTAT mg/dl  --   --   --   --   --   --   --   --   --   --   --  75  --    < > = values in this interval not displayed  16

## 2021-07-27 NOTE — ED PROVIDER NOTE - NS ED ROS FT
ROS: negative for fever, cough, headache, chest pain, shortness of breath, nausea, vomiting,  rash, paresthesia, and weakness, +abd pain, diarrhea--all other systems reviewed are negative. CONST: no fevers, no chills, no trauma  EYES: no pain, no visual disturbances  ENT: no sore throat, no epistaxis, no rhinorrhea, no hearing changes  CV: no chest pain, no palpitations, no orthopnea, no extremity pain or swelling  RESP: no shortness of breath, no cough, no sputum, no pleurisy, no wheezing  ABD: + abdominal pain, + nausea/vomiting, no diarrhea, no black or bloody stool  : no dysuria, no hematuria, no frequency, no urgency  MSK: no back pain, no neck pain, no extremity pain  NEURO: no headache, no sensory disturbances, no focal weakness, no dizziness  HEME: no easy bleeding or bruising  SKIN: no diaphoresis, no rash

## 2021-07-27 NOTE — ED PROVIDER NOTE - CLINICAL SUMMARY MEDICAL DECISION MAKING FREE TEXT BOX
Suspect gastroenteritis, will treat with GI cocktail, obtain labs then reassess. Based on abd exam, no need for CT, however with RLQ pain, consider Appendicitis. Discussed with pt, does not want CT, understands cannot r/o appendicitis without CT. Will reassess.

## 2021-07-27 NOTE — ED PROVIDER NOTE - OBJECTIVE STATEMENT
34 y/o F with PMHx of Migaines, Localized Swelling and PSHx of Abdominal Lipoma, , Removal of Ovarian Cyst, is  and breast feeding presents to the ED with c/o generalized abd pain which started around 10AM. Pt reports pain is in the epigastric area with nausea, had multiple episode of diarrhea but denies blood or mucous in the stool. Endorses spotting yesterday but denies vaginal bleeding, abnormal discharge, urinary sx, fevers/chills, recent travel or sick contact. Pt does admit her son had N/V on Saturday.

## 2021-07-29 LAB
CULTURE RESULTS: SIGNIFICANT CHANGE UP
SPECIMEN SOURCE: SIGNIFICANT CHANGE UP

## 2021-11-03 ENCOUNTER — APPOINTMENT (OUTPATIENT)
Dept: OBGYN | Facility: CLINIC | Age: 36
End: 2021-11-03

## 2021-11-08 ENCOUNTER — APPOINTMENT (OUTPATIENT)
Dept: OBGYN | Facility: CLINIC | Age: 36
End: 2021-11-08
Payer: MEDICAID

## 2021-11-08 VITALS
DIASTOLIC BLOOD PRESSURE: 78 MMHG | WEIGHT: 127 LBS | SYSTOLIC BLOOD PRESSURE: 121 MMHG | HEIGHT: 64 IN | BODY MASS INDEX: 21.68 KG/M2

## 2021-11-08 DIAGNOSIS — Z30.430 ENCOUNTER FOR INSERTION OF INTRAUTERINE CONTRACEPTIVE DEVICE: ICD-10-CM

## 2021-11-08 DIAGNOSIS — Z30.46 ENCOUNTER FOR SURVEILLANCE OF IMPLANTABLE SUBDERMAL CONTRACEPTIVE: ICD-10-CM

## 2021-11-08 DIAGNOSIS — Z30.09 ENCOUNTER FOR OTHER GENERAL COUNSELING AND ADVICE ON CONTRACEPTION: ICD-10-CM

## 2021-11-08 PROCEDURE — 11982 REMOVE DRUG IMPLANT DEVICE: CPT

## 2021-11-09 PROBLEM — Z30.09 STERILIZATION CONSULT: Status: ACTIVE | Noted: 2021-11-09

## 2021-11-09 PROBLEM — Z30.46 ENCOUNTER FOR NEXPLANON REMOVAL: Status: ACTIVE | Noted: 2021-11-09

## 2022-03-17 NOTE — ED PROVIDER NOTE - DISPOSITION TYPE
Called [facility] to get access to patients cpap they did not have patient in their records.  Previous orders show A@[facility].  Messaged Bossman in [place] to see if access could be granted to [facility].  Awaiting response.   
DISCHARGE

## 2022-04-04 NOTE — ASU PATIENT PROFILE, ADULT - AS SC BRADEN FRICTION
This is bid med   How about taking 3.125 mg po bid ?    Yes to refill  3 mo #1   (3) no apparent problem

## 2022-05-23 NOTE — H&P PST ADULT - NSCAFFEINEWITH_GEN_ALL_CORE_SD
headache Cyclosporine Counseling:  I discussed with the patient the risks of cyclosporine including but not limited to hypertension, gingival hyperplasia,myelosuppression, immunosuppression, liver damage, kidney damage, neurotoxicity, lymphoma, and serious infections. The patient understands that monitoring is required including baseline blood pressure, CBC, CMP, lipid panel and uric acid, and then 1-2 times monthly CMP and blood pressure.

## 2022-08-25 NOTE — ED ADULT NURSE NOTE - CAS TRG GENERAL NORM CIRC DET
[facility]  [phone]      Jose Antonio Smiley MD;  Location: [facility];  Service: Cardiology     EYE SURGERY       HERNIA REPAIR       INGUINAL HERNIA REPAIR Right 2020    Procedure: RIGHT INGUINAL HERNIA REPAIR WITH MESH;  Surgeon: Demian Lott MD;  Location: [facility];  Strong peripheral pulses

## 2022-09-22 ENCOUNTER — APPOINTMENT (OUTPATIENT)
Dept: OBGYN | Facility: CLINIC | Age: 37
End: 2022-09-22

## 2022-09-22 VITALS — SYSTOLIC BLOOD PRESSURE: 111 MMHG | DIASTOLIC BLOOD PRESSURE: 72 MMHG | BODY MASS INDEX: 20.43 KG/M2 | WEIGHT: 119 LBS

## 2022-09-22 DIAGNOSIS — N89.8 OTHER SPECIFIED NONINFLAMMATORY DISORDERS OF VAGINA: ICD-10-CM

## 2022-09-22 PROCEDURE — 99212 OFFICE O/P EST SF 10 MIN: CPT

## 2022-09-22 NOTE — PHYSICAL EXAM
[Chaperone Present] : A chaperone was present in the examining room during all aspects of the physical examination [Vulvitis] : vulvitis [Labia Majora] : normal [Labia Minora] : normal [Discharge] : a  ~M vaginal discharge was present [Moderate] : moderate [White] : white [Thick] : thick [Normal] : normal [Uterine Adnexae] : normal [FreeTextEntry1] : yeast

## 2022-09-22 NOTE — PLAN
[FreeTextEntry1] : 38 y/o F presenting with vaginitis \par -f/u GC/CT and BD affirm, therapy to be described as indicated\par -Will treat external irritation with clotrimazole, and fluconazole PO given. Rx sent\par -Recurrent vaginitis: discussed use of vaginal probiotic \par -Vulvovaginal hygiene reviewed\par \par

## 2022-09-22 NOTE — HISTORY OF PRESENT ILLNESS
[FreeTextEntry1] : Patient is a 36 y/o F presenting with vaginal itching, odor and discharge X 4weeks.\par  [Vagina] : vagina [Perineum] : perineum [Acute] : acute [TextBox_4] : yeast vagnitis

## 2022-09-23 LAB
C TRACH RRNA SPEC QL NAA+PROBE: NOT DETECTED
N GONORRHOEA RRNA SPEC QL NAA+PROBE: NOT DETECTED
SOURCE AMPLIFICATION: NORMAL

## 2022-09-26 ENCOUNTER — NON-APPOINTMENT (OUTPATIENT)
Age: 37
End: 2022-09-26

## 2022-09-26 LAB
CANDIDA VAG CYTO: DETECTED
G VAGINALIS+PREV SP MTYP VAG QL MICRO: NOT DETECTED
T VAGINALIS VAG QL WET PREP: NOT DETECTED

## 2023-06-21 NOTE — REVIEW OF SYSTEMS
[Genital Rash/Irritation] : genital rash/irritation [Skin Rash] : skin rash [Negative] : Heme/Lymph 7 to 9 [FreeTextEntry8] : vaginal discharge, vaginitis

## 2023-07-25 ENCOUNTER — APPOINTMENT (OUTPATIENT)
Dept: OBGYN | Facility: CLINIC | Age: 38
End: 2023-07-25
Payer: MEDICAID

## 2023-07-25 VITALS
BODY MASS INDEX: 22.36 KG/M2 | HEIGHT: 64 IN | SYSTOLIC BLOOD PRESSURE: 107 MMHG | WEIGHT: 131 LBS | DIASTOLIC BLOOD PRESSURE: 69 MMHG

## 2023-07-25 DIAGNOSIS — Z01.419 ENCOUNTER FOR GYNECOLOGICAL EXAMINATION (GENERAL) (ROUTINE) W/OUT ABNORMAL FINDINGS: ICD-10-CM

## 2023-07-25 DIAGNOSIS — N76.0 ACUTE VAGINITIS: ICD-10-CM

## 2023-07-25 PROCEDURE — 99395 PREV VISIT EST AGE 18-39: CPT

## 2023-07-25 PROCEDURE — 99213 OFFICE O/P EST LOW 20 MIN: CPT | Mod: 25

## 2023-07-26 LAB
DHEA-S SERPL-MCNC: 106 UG/DL
ESTIMATED AVERAGE GLUCOSE: 103 MG/DL
ESTRADIOL SERPL-MCNC: 124 PG/ML
FSH SERPL-MCNC: 4.2 IU/L
HBA1C MFR BLD HPLC: 5.2 %
HPV HIGH+LOW RISK DNA PNL CVX: NOT DETECTED
LH SERPL-ACNC: 14.7 IU/L
T4 FREE SERPL-MCNC: 1.1 NG/DL
TESTOST SERPL-MCNC: 17.7 NG/DL
TSH SERPL-ACNC: 1.52 UIU/ML

## 2023-07-26 NOTE — HISTORY OF PRESENT ILLNESS
[HIV test declined] : HIV test declined [Syphilis test declined] : Syphilis test declined [Gonorrhea test declined] : Gonorrhea test declined [Chlamydia test declined] : Chlamydia test declined [Trichomonas test declined] : Trichomonas test declined [HPV test declined] : HPV test declined [Hepatitis B test declined] : Hepatitis B test declined [Hepatitis C test declined] : Hepatitis C test declined [Condoms] : uses condoms [Y] : Positive pregnancy history [FreeTextEntry1] : Patient is a 38 y/o P3 presenting for an annual visit. LMP 6/11/23. She states that for the past 4-5 months, her periods have been very irregular. She also reports vulvar irritation, intense itching and some vaginal discharge. She denies vaginal odor. She does report dysuria but denies urinary urgency and frequency.\par \par OB Hx:\par SVDx1\par C/Sx2\par \par GYN Hx:\par Last pap 2022 - denies history of abnormal paps, STDs\par Sexually active with one male partner\par Uses condoms for contraception\par  [LMPDate] : 6/11/23

## 2023-07-26 NOTE — PLAN
[FreeTextEntry1] : 36 y/o P3 presenting for annual exam\par -Cervical cancer screening - up to date. Will be due in 2027\par -Amenorrhea - Urine pregnancy test negative today in office. AUB panel drawn. Will return for pelvic sonogram\par -Vaginitis - vaginitis panel collected. Will start clotrimazole-betamethasone for external irritation\par -Contraception - condoms\par -f/u for sonogram\par

## 2023-08-01 ENCOUNTER — NON-APPOINTMENT (OUTPATIENT)
Age: 38
End: 2023-08-01

## 2023-08-05 LAB
A VAGINAE DNA VAG QL NAA+PROBE: NORMAL
BACTERIA UR CULT: NORMAL
BVAB2 DNA VAG QL NAA+PROBE: NORMAL
C KRUSEI DNA VAG QL NAA+PROBE: NEGATIVE
C TRACH RRNA SPEC QL NAA+PROBE: NEGATIVE
CANDIDA DNA VAG QL NAA+PROBE: NEGATIVE
CYTOLOGY CVX/VAG DOC THIN PREP: NORMAL
MEGA1 DNA VAG QL NAA+PROBE: NORMAL
N GONORRHOEA RRNA SPEC QL NAA+PROBE: NEGATIVE
PROLACTIN SERPL-MCNC: 29.9 NG/ML
T VAGINALIS RRNA SPEC QL NAA+PROBE: NEGATIVE

## 2023-08-07 ENCOUNTER — APPOINTMENT (OUTPATIENT)
Dept: OBGYN | Facility: CLINIC | Age: 38
End: 2023-08-07

## 2023-08-07 ENCOUNTER — APPOINTMENT (OUTPATIENT)
Dept: ANTEPARTUM | Facility: CLINIC | Age: 38
End: 2023-08-07

## 2023-08-14 ENCOUNTER — APPOINTMENT (OUTPATIENT)
Dept: ANTEPARTUM | Facility: CLINIC | Age: 38
End: 2023-08-14
Payer: MEDICAID

## 2023-08-14 ENCOUNTER — APPOINTMENT (OUTPATIENT)
Dept: OBGYN | Facility: CLINIC | Age: 38
End: 2023-08-14
Payer: MEDICAID

## 2023-08-14 VITALS
SYSTOLIC BLOOD PRESSURE: 90 MMHG | WEIGHT: 132 LBS | HEIGHT: 64 IN | DIASTOLIC BLOOD PRESSURE: 57 MMHG | BODY MASS INDEX: 22.53 KG/M2

## 2023-08-14 DIAGNOSIS — N93.9 ABNORMAL UTERINE AND VAGINAL BLEEDING, UNSPECIFIED: ICD-10-CM

## 2023-08-14 PROCEDURE — 76830 TRANSVAGINAL US NON-OB: CPT

## 2023-08-14 PROCEDURE — 99213 OFFICE O/P EST LOW 20 MIN: CPT

## 2023-08-14 PROCEDURE — 76857 US EXAM PELVIC LIMITED: CPT

## 2023-08-15 PROBLEM — N93.9 ABNORMAL UTERINE BLEEDING: Status: ACTIVE | Noted: 2023-07-26

## 2023-08-15 NOTE — PLAN
[FreeTextEntry1] : 37 y.o. with amenorrhea, now with menses -Pelvic sonogram normal -Discussed lab results again with patient. Discussed that the elevated prolactin can cause irregular menses. Pt to schedule MRI. Will follow up results

## 2023-08-15 NOTE — HISTORY OF PRESENT ILLNESS
[FreeTextEntry1] : The patient is a 37 y.o. presenting for a follow up for amenorrhea. She reports that she did just get her menses after 2 full months of amenorrhea. She is otherwise doing well and denies any complaints. She had AUB labs performed at last visit which were significant for an elevated prolactin. The patient was fasting at the time and is going to schedule a MRI to evaluate for a prolactinoma.

## 2023-08-22 ENCOUNTER — APPOINTMENT (OUTPATIENT)
Dept: MAMMOGRAPHY | Facility: CLINIC | Age: 38
End: 2023-08-22
Payer: MEDICAID

## 2023-08-22 ENCOUNTER — APPOINTMENT (OUTPATIENT)
Dept: ULTRASOUND IMAGING | Facility: CLINIC | Age: 38
End: 2023-08-22
Payer: MEDICAID

## 2023-08-22 PROCEDURE — 77062 BREAST TOMOSYNTHESIS BI: CPT

## 2023-08-22 PROCEDURE — 76641 ULTRASOUND BREAST COMPLETE: CPT | Mod: 50

## 2023-08-22 PROCEDURE — 77066 DX MAMMO INCL CAD BI: CPT

## 2023-09-06 ENCOUNTER — APPOINTMENT (OUTPATIENT)
Dept: OBGYN | Facility: CLINIC | Age: 38
End: 2023-09-06
Payer: MEDICAID

## 2023-09-06 VITALS — DIASTOLIC BLOOD PRESSURE: 83 MMHG | WEIGHT: 132 LBS | SYSTOLIC BLOOD PRESSURE: 122 MMHG | BODY MASS INDEX: 22.66 KG/M2

## 2023-09-06 PROCEDURE — 36415 COLL VENOUS BLD VENIPUNCTURE: CPT

## 2023-09-06 PROCEDURE — 99213 OFFICE O/P EST LOW 20 MIN: CPT

## 2023-09-06 NOTE — HISTORY OF PRESENT ILLNESS
[FreeTextEntry1] : 38 year old female presenting for follow up. She c/o new onset of L breast pain and breast lump which she noticed 2 weeks ago. On exam, no lump noted. She recently had breast sono done two weeks ago which was wnl. Sono revealed galactocele which were benign.  She has Hx of elevated prolactin

## 2023-09-06 NOTE — PLAN
[FreeTextEntry1] : -reviewed breast sono which was wnl, will repeat in 6 months  -f/u prolactin level done today -f/u PRN

## 2023-09-10 ENCOUNTER — NON-APPOINTMENT (OUTPATIENT)
Age: 38
End: 2023-09-10

## 2023-09-11 LAB — PROLACTIN SERPL-MCNC: 46.9 NG/ML

## 2023-09-14 ENCOUNTER — APPOINTMENT (OUTPATIENT)
Dept: MRI IMAGING | Facility: IMAGING CENTER | Age: 38
End: 2023-09-14
Payer: MEDICAID

## 2023-09-14 ENCOUNTER — OUTPATIENT (OUTPATIENT)
Dept: OUTPATIENT SERVICES | Facility: HOSPITAL | Age: 38
LOS: 1 days | End: 2023-09-14
Payer: MEDICAID

## 2023-09-14 DIAGNOSIS — T83.83XA HEMORRHAGE DUE TO GENITOURINARY PROSTHETIC DEVICES, IMPLANTS AND GRAFTS, INITIAL ENCOUNTER: Chronic | ICD-10-CM

## 2023-09-14 DIAGNOSIS — Z98.891 HISTORY OF UTERINE SCAR FROM PREVIOUS SURGERY: Chronic | ICD-10-CM

## 2023-09-14 DIAGNOSIS — D17.1 BENIGN LIPOMATOUS NEOPLASM OF SKIN AND SUBCUTANEOUS TISSUE OF TRUNK: Chronic | ICD-10-CM

## 2023-09-14 DIAGNOSIS — Z00.8 ENCOUNTER FOR OTHER GENERAL EXAMINATION: ICD-10-CM

## 2023-09-14 DIAGNOSIS — Z98.89 OTHER SPECIFIED POSTPROCEDURAL STATES: Chronic | ICD-10-CM

## 2023-09-14 DIAGNOSIS — R79.89 OTHER SPECIFIED ABNORMAL FINDINGS OF BLOOD CHEMISTRY: ICD-10-CM

## 2023-09-14 PROCEDURE — 70553 MRI BRAIN STEM W/O & W/DYE: CPT

## 2023-09-14 PROCEDURE — A9585: CPT

## 2023-09-14 PROCEDURE — 70553 MRI BRAIN STEM W/O & W/DYE: CPT | Mod: 26

## 2023-09-25 DIAGNOSIS — D35.2 BENIGN NEOPLASM OF PITUITARY GLAND: ICD-10-CM

## 2023-09-25 DIAGNOSIS — E22.9 BENIGN NEOPLASM OF PITUITARY GLAND: ICD-10-CM

## 2023-10-16 ENCOUNTER — APPOINTMENT (OUTPATIENT)
Dept: NEUROLOGY | Facility: CLINIC | Age: 38
End: 2023-10-16
Payer: MEDICAID

## 2023-10-16 VITALS
SYSTOLIC BLOOD PRESSURE: 112 MMHG | WEIGHT: 132 LBS | HEART RATE: 86 BPM | HEIGHT: 64 IN | BODY MASS INDEX: 22.53 KG/M2 | DIASTOLIC BLOOD PRESSURE: 77 MMHG

## 2023-10-16 DIAGNOSIS — R79.89 OTHER SPECIFIED ABNORMAL FINDINGS OF BLOOD CHEMISTRY: ICD-10-CM

## 2023-10-16 PROCEDURE — 99215 OFFICE O/P EST HI 40 MIN: CPT

## 2024-02-16 ENCOUNTER — APPOINTMENT (OUTPATIENT)
Dept: NEUROLOGY | Facility: CLINIC | Age: 39
End: 2024-02-16
Payer: MEDICAID

## 2024-02-16 VITALS
HEART RATE: 112 BPM | HEIGHT: 64 IN | SYSTOLIC BLOOD PRESSURE: 116 MMHG | WEIGHT: 132 LBS | BODY MASS INDEX: 22.53 KG/M2 | DIASTOLIC BLOOD PRESSURE: 76 MMHG

## 2024-02-16 PROCEDURE — 99215 OFFICE O/P EST HI 40 MIN: CPT

## 2024-02-16 RX ORDER — CABERGOLINE 0.5 MG/1
TABLET ORAL
Refills: 0 | Status: ACTIVE | COMMUNITY

## 2024-02-16 NOTE — PHYSICAL EXAM
[General Appearance - Alert] : alert [General Appearance - Well Developed] : well developed [Oriented To Time, Place, And Person] : oriented to person, place, and time [Person] : oriented to person [Place] : oriented to place [Time] : oriented to time [Short Term Intact] : short term memory intact [Fluency] : fluency intact [Current Events] : adequate knowledge of current events [Cranial Nerves Oculomotor (III)] : extraocular motion intact [Cranial Nerves Facial (VII)] : face symmetrical [Cranial Nerves Vestibulocochlear (VIII)] : hearing was intact bilaterally [Cranial Nerves Accessory (XI - Cranial And Spinal)] : head turning and shoulder shrug symmetric [Cranial Nerves Hypoglossal (XII)] : there was no tongue deviation with protrusion [Motor Strength] : muscle strength was normal in all four extremities [Abnormal Walk] : normal gait [Coordination - Dysmetria Impaired Finger-to-Nose Bilateral] : not present

## 2024-02-16 NOTE — HISTORY OF PRESENT ILLNESS
[FreeTextEntry1] :  Pt was last seen in 2016. Since then, she was tried on nortriptyline but b/c it caused sedation, she stopped taking it. She states over the past 2 months, she had migraine exacerbation. Now that she's on the 2nd month of her pregnancy, she is feeling better. She has nausea due to pregnancy.  Interval history October 16, 2023: Patient has been having migraines about 15 to 18 days a month.  Patient had irregular menses and her prolactin level was checked by her GYN which showed elevation.  Patient had an MRI of the brain which showed a 3 mm nodular focus of hypoenhancement in the left posterior adenohypophysis which was suggestive of microadenoma.  Patient has a pending endocrine evaluation.  Patient denies any constant visual complaints but there is blurred vision at times.  Patient has tried nortriptyline for headaches in the past but stopped because of sedation.  Patient states that she would like to restart the nortriptyline as she is experiencing insomnia recently.

## 2024-02-16 NOTE — DISCUSSION/SUMMARY
[FreeTextEntry1] : 38-year-old woman who has a history of migraine is here for follow-up of a different symptom of microadenoma seen on MRI due to irregular menses.  Patient will restart nortriptyline 25 mg at night.  Patient will also see neuro-ophthalmology for testing of her visual field.  Patient will follow-up with me after the studies are completed.  I spent the time noted on the day of this patient encounter preparing for, providing and documenting the above E/M service and counseling and educate patient on differential, workup, disease course, and treatment/management. Education was provided to the patient during this encounter. All questions and concerns were answered and addressed in detail. The patient verbalized understanding and agreed to plan. Patient was advised to continue to monitor for neurologic symptoms and to notify my office or go to the nearest emergency room if there are any changes. Any orders/referrals and communications were provided as well.  Side effects of the above medications were discussed in detail including but not limited to applicable black box warning and teratogenicity as appropriate.  Patient was advised to bring previous records to my office.

## 2024-02-16 NOTE — DISCUSSION/SUMMARY
[FreeTextEntry1] : 38-year-old woman who is here for follow-up of migraine for which she is responding very well with nortriptyline at night.  Patient saw neuro-ophthalmology and reports that exam was unremarkable.  Patient also follows with her endocrinologist for the micro adenoma of the pituitary seen on MRI of the brain and has been started on cabergoline.  Patient will follow-up with me after her trip to [place] in the summer.  Nortriptyline was refilled for her.  I spent the time noted on the day of this patient encounter preparing for, providing and documenting the above E/M service and counseling and educate patient on differential, workup, disease course, and treatment/management. Education was provided to the patient during this encounter. All questions and concerns were answered and addressed in detail. The patient verbalized understanding and agreed to plan. Patient was advised to continue to monitor for neurologic symptoms and to notify my office or go to the nearest emergency room if there are any changes. Any orders/referrals and communications were provided as well.  Side effects of the above medications were discussed in detail including but not limited to applicable black box warning and teratogenicity as appropriate.  Patient was advised to bring previous records to my office.

## 2024-02-16 NOTE — HISTORY OF PRESENT ILLNESS
[FreeTextEntry1] :  Pt was last seen in 2016. Since then, she was tried on nortriptyline but b/c it caused sedation, she stopped taking it. She states over the past 2 months, she had migraine exacerbation. Now that she's on the 2nd month of her pregnancy, she is feeling better. She has nausea due to pregnancy.  Interval history October 16, 2023: Patient has been having migraines about 15 to 18 days a month.  Patient had irregular menses and her prolactin level was checked by her GYN which showed elevation.  Patient had an MRI of the brain which showed a 3 mm nodular focus of hypoenhancement in the left posterior adenohypophysis which was suggestive of microadenoma.  Patient has a pending endocrine evaluation.  Patient denies any constant visual complaints but there is blurred vision at times.  Patient has tried nortriptyline for headaches in the past but stopped because of sedation.  Patient states that she would like to restart the nortriptyline as she is experiencing insomnia recently.  Interval history February 16, 2024: Since taking nortriptyline every day patient's migraines have improved.  Patient reports no side effects.  Since October patient has only had 2 migraines.  Patient has a new medication of cabergoline from her endocrinologist for her pituitary adenoma.  Patient is doing well.

## 2024-03-04 NOTE — ED ADULT NURSE NOTE - IS THE PATIENT ABLE TO BE SCREENED?
ear, unspecified chronicity, unspecified type  -     ciprofloxacin-dexAMETHasone (CIPRODEX) 0.3-0.1 % otic suspension; Place 4 drops into the left ear 2 times daily for 7 days    Bilateral impacted cerumen    Controlled type 1 diabetes mellitus with other diabetic kidney complication (HCC)  -     POCT glycosylated hemoglobin (Hb A1C)    Sore throat  -     POCT Rapid Strep A DNA (Alere i)    Non-recurrent acute serous otitis media of left ear  -     cefdinir (OMNICEF) 300 MG capsule; Take 1 capsule by mouth 2 times daily for 10 days    Bilateral impactions are debrided by staff nurse.  Patient has chronic intermittent left otitis externa and he is prescribed Ciprodex, and antibiotic backup as it appears he may be developing otitis media in the same ear.    Strep test is negative.    Patient requested A1c today - 6.5    Return if symptoms worsen or fail to improve.    Patient instructions given andreviewed.        Electronically signed by JESÚS Fairchild CNP on 3/4/2024 at 9:27 AM                
No

## 2024-05-16 ENCOUNTER — APPOINTMENT (OUTPATIENT)
Dept: NEUROLOGY | Facility: CLINIC | Age: 39
End: 2024-05-16
Payer: MEDICAID

## 2024-05-16 DIAGNOSIS — R51.9 HEADACHE, UNSPECIFIED: ICD-10-CM

## 2024-05-16 DIAGNOSIS — G44.40 DRUG-INDUCED HEADACHE, NOT ELSEWHERE CLASSIFIED, NOT INTRACTABLE: ICD-10-CM

## 2024-05-16 PROCEDURE — 99215 OFFICE O/P EST HI 40 MIN: CPT | Mod: 95

## 2024-05-16 PROCEDURE — G2211 COMPLEX E/M VISIT ADD ON: CPT | Mod: NC,1L

## 2024-05-16 RX ORDER — NORTRIPTYLINE HYDROCHLORIDE 75 MG/1
75 CAPSULE ORAL
Qty: 30 | Refills: 5 | Status: DISCONTINUED | COMMUNITY
Start: 2023-10-16 | End: 2024-05-16

## 2024-05-16 NOTE — DISCUSSION/SUMMARY
[FreeTextEntry1] : 38-year-old woman who is here for follow-up of her migraine which responded well with nortriptyline but had constipation and dry mouth as side effects precluding her continuation.  Patient will try Topamax 50 mg twice a day for prevention of her migraines.  Side effects were discussed with the patient in detail.  Patient will continue to follow-up with her endocrinologist for her pituitary adenoma treatment on cabergoline.  physician location: office patient location: home  I spent 40 minutes of total time, during which more than 50% of the time was spent on counseling. I explained the diagnosis, other possible diagnoses, workup, and management, as well as answered any questions.  This is a telemedicine visit that was performed using real time 2-way audiovisual technology. Verbal consent to participate in video visit was obtained and patient was aware of their right to refuse to participate in services delivered via telemedicine and the alternative and potential limitations of participating in a telemedicine visit vs a face-to-face visit; I have also informed the patient of my current location in the [facility] [place] and the names of all persons participating in the telemedicine service and their role in the encounter. The patient agrees to have this service via Telehealth.   This visit occurred during the Coronavirus (COVID-19) Public Health Emergency. I discussed with the patient the nature of our telemedicine visits, that:   I would evaluate the patient and recommend diagnostics and treatments based on my assessment   Our sessions are not being recorded and that personal health information is protected   Our team would provide follow up care in person if/when the patient needs it.

## 2024-05-16 NOTE — HISTORY OF PRESENT ILLNESS
[FreeTextEntry1] : verbal consent given on 05/16/2024 and 10:43  by SHANNAN MURPHY at [address] [place] [address] Pt was last seen in 2016. Since then, she was tried on nortriptyline but b/c it caused sedation, she stopped taking it. She states over the past 2 months, she had migraine exacerbation. Now that she's on the 2nd month of her pregnancy, she is feeling better. She has nausea due to pregnancy.  Interval history October 16, 2023: Patient has been having migraines about 15 to 18 days a month.  Patient had irregular menses and her prolactin level was checked by her GYN which showed elevation.  Patient had an MRI of the brain which showed a 3 mm nodular focus of hypoenhancement in the left posterior adenohypophysis which was suggestive of microadenoma.  Patient has a pending endocrine evaluation.  Patient denies any constant visual complaints but there is blurred vision at times.  Patient has tried nortriptyline for headaches in the past but stopped because of sedation.  Patient states that she would like to restart the nortriptyline as she is experiencing insomnia recently.  Interval history February 16, 2024: Since taking nortriptyline every day patient's migraines have improved.  Patient reports no side effects.  Since October patient has only had 2 migraines.  Patient has a new medication of cabergoline from her endocrinologist for her pituitary adenoma.  Patient is doing well.  Interval history 5/16/2024: Patient started experiencing constipation with nortriptyline x 2.  Patient also experience dry mouth and therefore has stopped the nortriptyline.  Patient has no history of glaucoma or kidney stones and therefore we will try Topamax 50 mg twice daily for prevention of her migraine.  CONSENT FOR VIDEO MEDICAL VISIT1. I understand that my physician wishes me to engage in a telemedicine consultation.2. My physician has explained to me how the video conferencing technology will be used to affect such a consultation will not be the same as a direct patient/health care provider visit due to the fact that I will not be in the same room as my physician 3. I understand there are potential risks to this technology, including interruptions, unauthorized access and technical difficulties. I understand that my health care provider or I can discontinue the telemedicine consult/visit if it is felt that the videoconferencing connections are not adequate for the situation.4. I understand that my healthcare information may be shared with other individuals for scheduling and billing purposes. Others may also be present during the consultation other than my physician and consulting health care provider in order to operate the video equipment. The above mentioned people will all maintain confidentiality of the information obtained. I further understand that I will be informed of their presence in the consultation and thus will have the right to request the following: (1) omit specific details of my medical history/physical examination that are personally sensitive to me; (2) ask non-medical personnel to leave the telemedicine examination room: and or (3) terminate the consultation at any time.5. I have had the alternatives to a telemedicine consultation explained to me, and in choosing to participate in a telemedicine consultation. I understand that some parts of the exam involving physical tests may be conducted by individuals at my location at the direction of the consulting health care provider.6. In an emergent consultation, I understand that the responsibility of the telemedicine consulting specialist is to advise my local practitioner and that the specialists responsibility will conclude upon the termination of the video conference connection.7. I understand that billing will occur from both my physician and as a facility fee from the site from which I am presented.8. I have had a direct conversation with my physician, during which I had the opportunity to ask questions in regard to this procedure. My questions have been answered and the risks, benefits and any practical alternatives have been discussed with me in a language in which I understand

## 2024-06-11 ENCOUNTER — NON-APPOINTMENT (OUTPATIENT)
Age: 39
End: 2024-06-11

## 2024-06-12 ENCOUNTER — EMERGENCY (EMERGENCY)
Facility: HOSPITAL | Age: 39
LOS: 0 days | Discharge: HOME | End: 2024-06-12
Attending: STUDENT IN AN ORGANIZED HEALTH CARE EDUCATION/TRAINING PROGRAM
Payer: MEDICAID

## 2024-06-12 VITALS
DIASTOLIC BLOOD PRESSURE: 69 MMHG | RESPIRATION RATE: 18 BRPM | HEART RATE: 81 BPM | SYSTOLIC BLOOD PRESSURE: 99 MMHG | TEMPERATURE: 97 F | OXYGEN SATURATION: 100 %

## 2024-06-12 VITALS
SYSTOLIC BLOOD PRESSURE: 111 MMHG | DIASTOLIC BLOOD PRESSURE: 76 MMHG | TEMPERATURE: 98 F | HEART RATE: 77 BPM | HEIGHT: 64 IN | WEIGHT: 130.07 LBS | RESPIRATION RATE: 18 BRPM | OXYGEN SATURATION: 99 %

## 2024-06-12 DIAGNOSIS — Z86.018 PERSONAL HISTORY OF OTHER BENIGN NEOPLASM: ICD-10-CM

## 2024-06-12 DIAGNOSIS — D17.1 BENIGN LIPOMATOUS NEOPLASM OF SKIN AND SUBCUTANEOUS TISSUE OF TRUNK: Chronic | ICD-10-CM

## 2024-06-12 DIAGNOSIS — R11.0 NAUSEA: ICD-10-CM

## 2024-06-12 DIAGNOSIS — T83.83XA HEMORRHAGE DUE TO GENITOURINARY PROSTHETIC DEVICES, IMPLANTS AND GRAFTS, INITIAL ENCOUNTER: Chronic | ICD-10-CM

## 2024-06-12 DIAGNOSIS — R51.9 HEADACHE, UNSPECIFIED: ICD-10-CM

## 2024-06-12 DIAGNOSIS — Z98.891 HISTORY OF UTERINE SCAR FROM PREVIOUS SURGERY: Chronic | ICD-10-CM

## 2024-06-12 DIAGNOSIS — Z98.891 HISTORY OF UTERINE SCAR FROM PREVIOUS SURGERY: ICD-10-CM

## 2024-06-12 DIAGNOSIS — R42 DIZZINESS AND GIDDINESS: ICD-10-CM

## 2024-06-12 DIAGNOSIS — G43.909 MIGRAINE, UNSPECIFIED, NOT INTRACTABLE, WITHOUT STATUS MIGRAINOSUS: ICD-10-CM

## 2024-06-12 DIAGNOSIS — Z86.69 PERSONAL HISTORY OF OTHER DISEASES OF THE NERVOUS SYSTEM AND SENSE ORGANS: ICD-10-CM

## 2024-06-12 LAB
ALBUMIN SERPL ELPH-MCNC: 3.8 G/DL — SIGNIFICANT CHANGE UP (ref 3.3–5)
ALP SERPL-CCNC: 71 U/L — SIGNIFICANT CHANGE UP (ref 40–120)
ALT FLD-CCNC: 26 U/L — SIGNIFICANT CHANGE UP (ref 12–78)
ANION GAP SERPL CALC-SCNC: 4 MMOL/L — LOW (ref 5–17)
AST SERPL-CCNC: 19 U/L — SIGNIFICANT CHANGE UP (ref 15–37)
BASOPHILS # BLD AUTO: 0.05 K/UL — SIGNIFICANT CHANGE UP (ref 0–0.2)
BASOPHILS NFR BLD AUTO: 0.6 % — SIGNIFICANT CHANGE UP (ref 0–2)
BILIRUB SERPL-MCNC: 0.4 MG/DL — SIGNIFICANT CHANGE UP (ref 0.2–1.2)
BUN SERPL-MCNC: 7 MG/DL — SIGNIFICANT CHANGE UP (ref 7–23)
CALCIUM SERPL-MCNC: 9.1 MG/DL — SIGNIFICANT CHANGE UP (ref 8.5–10.1)
CHLORIDE SERPL-SCNC: 107 MMOL/L — SIGNIFICANT CHANGE UP (ref 96–108)
CO2 SERPL-SCNC: 28 MMOL/L — SIGNIFICANT CHANGE UP (ref 22–31)
CREAT SERPL-MCNC: 0.6 MG/DL — SIGNIFICANT CHANGE UP (ref 0.5–1.3)
EGFR: 117 ML/MIN/1.73M2 — SIGNIFICANT CHANGE UP
EOSINOPHIL # BLD AUTO: 0.35 K/UL — SIGNIFICANT CHANGE UP (ref 0–0.5)
EOSINOPHIL NFR BLD AUTO: 4.3 % — SIGNIFICANT CHANGE UP (ref 0–6)
GLUCOSE SERPL-MCNC: 85 MG/DL — SIGNIFICANT CHANGE UP (ref 70–99)
HCG SERPL-ACNC: <1 MIU/ML — SIGNIFICANT CHANGE UP
HCT VFR BLD CALC: 39.7 % — SIGNIFICANT CHANGE UP (ref 34.5–45)
HGB BLD-MCNC: 13.5 G/DL — SIGNIFICANT CHANGE UP (ref 11.5–15.5)
IMM GRANULOCYTES NFR BLD AUTO: 0.6 % — SIGNIFICANT CHANGE UP (ref 0–0.9)
LYMPHOCYTES # BLD AUTO: 2.74 K/UL — SIGNIFICANT CHANGE UP (ref 1–3.3)
LYMPHOCYTES # BLD AUTO: 33.7 % — SIGNIFICANT CHANGE UP (ref 13–44)
MAGNESIUM SERPL-MCNC: 2 MG/DL — SIGNIFICANT CHANGE UP (ref 1.6–2.6)
MCHC RBC-ENTMCNC: 28.4 PG — SIGNIFICANT CHANGE UP (ref 27–34)
MCHC RBC-ENTMCNC: 34 G/DL — SIGNIFICANT CHANGE UP (ref 32–36)
MCV RBC AUTO: 83.6 FL — SIGNIFICANT CHANGE UP (ref 80–100)
MONOCYTES # BLD AUTO: 0.67 K/UL — SIGNIFICANT CHANGE UP (ref 0–0.9)
MONOCYTES NFR BLD AUTO: 8.2 % — SIGNIFICANT CHANGE UP (ref 2–14)
NEUTROPHILS # BLD AUTO: 4.27 K/UL — SIGNIFICANT CHANGE UP (ref 1.8–7.4)
NEUTROPHILS NFR BLD AUTO: 52.6 % — SIGNIFICANT CHANGE UP (ref 43–77)
NRBC # BLD: 0 /100 WBCS — SIGNIFICANT CHANGE UP (ref 0–0)
PLATELET # BLD AUTO: 226 K/UL — SIGNIFICANT CHANGE UP (ref 150–400)
POTASSIUM SERPL-MCNC: 4 MMOL/L — SIGNIFICANT CHANGE UP (ref 3.5–5.3)
POTASSIUM SERPL-SCNC: 4 MMOL/L — SIGNIFICANT CHANGE UP (ref 3.5–5.3)
PROT SERPL-MCNC: 8 GM/DL — SIGNIFICANT CHANGE UP (ref 6–8.3)
RBC # BLD: 4.75 M/UL — SIGNIFICANT CHANGE UP (ref 3.8–5.2)
RBC # FLD: 13.3 % — SIGNIFICANT CHANGE UP (ref 10.3–14.5)
SODIUM SERPL-SCNC: 139 MMOL/L — SIGNIFICANT CHANGE UP (ref 135–145)
WBC # BLD: 8.13 K/UL — SIGNIFICANT CHANGE UP (ref 3.8–10.5)
WBC # FLD AUTO: 8.13 K/UL — SIGNIFICANT CHANGE UP (ref 3.8–10.5)

## 2024-06-12 PROCEDURE — 99284 EMERGENCY DEPT VISIT MOD MDM: CPT

## 2024-06-12 PROCEDURE — 93010 ELECTROCARDIOGRAM REPORT: CPT

## 2024-06-12 PROCEDURE — 70450 CT HEAD/BRAIN W/O DYE: CPT | Mod: 26,MC

## 2024-06-12 RX ORDER — DIPHENHYDRAMINE HCL 50 MG
25 CAPSULE ORAL ONCE
Refills: 0 | Status: COMPLETED | OUTPATIENT
Start: 2024-06-12 | End: 2024-06-12

## 2024-06-12 RX ORDER — KETOROLAC TROMETHAMINE 30 MG/ML
15 SYRINGE (ML) INJECTION ONCE
Refills: 0 | Status: DISCONTINUED | OUTPATIENT
Start: 2024-06-12 | End: 2024-06-12

## 2024-06-12 RX ORDER — SODIUM CHLORIDE 9 MG/ML
1000 INJECTION INTRAMUSCULAR; INTRAVENOUS; SUBCUTANEOUS ONCE
Refills: 0 | Status: COMPLETED | OUTPATIENT
Start: 2024-06-12 | End: 2024-06-12

## 2024-06-12 RX ORDER — METOCLOPRAMIDE HCL 10 MG
10 TABLET ORAL ONCE
Refills: 0 | Status: COMPLETED | OUTPATIENT
Start: 2024-06-12 | End: 2024-06-12

## 2024-06-12 RX ADMIN — Medication 15 MILLIGRAM(S): at 17:40

## 2024-06-12 RX ADMIN — SODIUM CHLORIDE 1000 MILLILITER(S): 9 INJECTION INTRAMUSCULAR; INTRAVENOUS; SUBCUTANEOUS at 17:46

## 2024-06-12 RX ADMIN — Medication 10 MILLIGRAM(S): at 17:40

## 2024-06-12 RX ADMIN — Medication 25 MILLIGRAM(S): at 17:40

## 2024-06-12 NOTE — ED ADULT NURSE NOTE - NSFALLUNIVINTERV_ED_ALL_ED
Bed/Stretcher in lowest position, wheels locked, appropriate side rails in place/Call bell, personal items and telephone in reach/Instruct patient to call for assistance before getting out of bed/chair/stretcher/Non-slip footwear applied when patient is off stretcher/[place] to call system/Physically safe environment - no spills, clutter or unnecessary equipment/Purposeful proactive rounding/Room/bathroom lighting operational, light cord in reach

## 2024-06-12 NOTE — ED PROVIDER NOTE - OBJECTIVE STATEMENT
38F PMH migraines, hx pituitary adenoma pw L sided h/a a/w nausea, dizziness, forgetfulness, photophobia / phonophobia x1 day. Pt reports prescribed daily nortriptyline for migraines, but w/ side effects of dry mouth and constipation, pt w/ planned travel to [place] and so asked Neurologist (Dr James) to change medication. Pt took 1st dose of Topiramate last night, w/ onset dizziness, chest heaviness and SOB x2-3hrs s/p taking medication. Pt able to go to sleep by 1am this AM. Pt awoke feeling OK, but w/ onset worsening dizziness - describes as off balance, like she will fall and room spinning sensation - as day went on. Pt now also w/ L sided migraine. Pt called her Neuro and was advised to go to ED for evaluation.     PMH as above, PSH , NKDA, Meds as listed.

## 2024-06-12 NOTE — ED PROVIDER NOTE - CLINICAL SUMMARY MEDICAL DECISION MAKING FREE TEXT BOX
38F PMH migraines, hx pituitary adenoma pw L sided h/a a/w nausea, dizziness, forgetfulness, photophobia / phonophobia x1 day as well as adverse reaction to new migraine medication taken for 1st time last night. Afebrile, VSS. Well appearing, in NAD. No focal neuro deficits on exam. Plan for pain control, CBC, CMP, HCG, mag, CT brain. Re-eval. 38F PMH migraines, hx pituitary adenoma pw L sided h/a a/w nausea, dizziness, forgetfulness, photophobia / phonophobia x1 day as well as adverse reaction to new migraine medication taken for 1st time last night. Afebrile, VSS. Well appearing, in NAD. No focal neuro deficits on exam. Plan for pain control, CBC, CMP, HCG, mag, CT brain. Re-eval.  W/u w/o significant abnormalities, CT brain imaging unchanged from prior. On re-eval, pt resting comfortably. Reports feeling much better s/p ED medications and requests d/c home. Stable for d/c. Instructed for close outpatient f/u w/ her Neurologist. Return signs / symptoms d/w pt. She understands / agrees w/ this plan.

## 2024-06-12 NOTE — ED PROVIDER NOTE - CARE PROVIDER_API CALL
Carissa Galvez  Neurology  [address] [place] [address]  Phone: [phone]  Fax: [phone]  Follow Up Time: 4-6 Days

## 2024-06-12 NOTE — ED ADULT NURSE NOTE - OBJECTIVE STATEMENT
pt sent in from Neurologist office c/o dizziness, forgetfulness and nausea after starting topiramate yesterday. pt stated this is the first time she has experienced these symptoms after taking a medication. pt is also experiencing blurry and double vision also starting this morning. pt denies taking any pain additional pain medication.  PMH Migraines and Pituitary tumor.

## 2024-06-12 NOTE — ED ADULT TRIAGE NOTE - BEFAST ARM SIDE DRIFT
OFFICE VISIT    Patient: Randi Salmon   : 2001 MRN: [id number]    SUBJECTIVE:  Chief Complaint   Patient presents with   • Establish Care   • Physical       Patient has given consent to record this visit for documentation in their clinical record.    A 21-year-old female presents for an establishment of care.     HISTORY OF PRESENT ILLNESS:  Historian: Self    Gastroesophageal reflux disease without esophagitis: Occasionally has chest pain. Takes omeprazole and TUMS. Takes Omeprazole for 2 weeks usually. Had two episodes of heartburn in last 4 weeks. Last episode was 3 weeks ago, took omeprazole. Did not have any episode in last 2 weeks. Heartburn is associated with nausea, chest pain. Relieves on medication. Denies waking up at night due to cough, heartburn. Denies sour taste in mouth, diarrhea, blood in stools, vomiting. No changes in weight. Consulted  on call once. Visited  in 2019, underwent H.Pylori test, was negative. Was advised by him to take Pepcid as needed. Since then, she continues to have the episodes on and off. Denies eating late night, drinking caffeine. Admits eating outside often.     Encounter for general adult medical examination with abnormal findings: Overall feels good. Has regular menstrual cycle, last period started last Thursday. Due for the flu shot, COVID-19 booster dose.     Vitamin D deficiency: Previous labs reveal low levels of vitamin D.    Patient's past medical history, personal history, family history, social history as well as allergy and medications were reviewed and updated accordingly.    PAST MEDICAL HISTORY:  Past Medical History:   Diagnosis Date   • Dental cavities      MEDICATIONS:  Current Outpatient Medications   Medication Sig Dispense Refill   • famotidine (PEPCID) 40 MG tablet TAKE 1 TABLET BY MOUTH DAILY 90 tablet 0     No current facility-administered medications for this visit.     ALLERGIES:  Allergies as of 2022   • (No Known  Allergies)     FAMILY HISTORY:  Family History   Problem Relation Age of Onset   • GERD Mother    • Diabetes Maternal Grandmother    • Cancer Maternal Grandmother         uterine?     SOCIAL HISTORY:  Social History     Tobacco Use   • Smoking status: Never Smoker   • Smokeless tobacco: Never Used   Vaping Use   • Vaping Use: never used   Substance Use Topics   • Alcohol use: Yes     Comment: social   • Drug use: Never     Past Surgical HISTORY  Past Surgical History:   Procedure Laterality Date   • No past surgeries         REVIEW OF SYSTEMS:  CVS: No breathing problems.  GIT:  as per HPI.  Constitutional: as per HPI.    OBJECTIVE:  Visit Vitals  /77   Pulse 73   Resp 18   Ht 5' 5\"   Wt 51.3 kg (113 lb)   LMP 11/03/2022   SpO2 100%   BMI 18.80 kg/m²       PHYSICAL EXAM:  Constitutional: Alert, in no acute distress and current vital signs reviewed.   Head and Face: Atraumatic and normocephalic.   Eyes: No discharge, no eyelid swelling and the sclerae were normal.   ENT: Oropharynx normal. Normal appearing outer ear. Tympanic membranes are bilaterally clear, normal appearing nose and normal lips.   Neck: Normal appearing neck and supple neck.   Pulmonary: Breath sounds clear to auscultation bilaterally, but no respiratory distress and normal respiratory rate and effort.   Cardiovascular: Normal rate, regular rhythm, normal S1, normal S2 and edema was not present in the lower extremities.   Abdomen: Flat.  Bowel sounds normal.  Soft and nontender.  No epigastric tenderness  Skin, Hair, Nails: Normal skin color and pigmentation.   Psychiatric: Alert and awake, interactive and mood/affect were appropriate.  Breast: Breast exa bilateral symmetric. No mass. No tenderness.      DIAGNOSTIC STUDIES:  LAB RESULTS:  Office Visit on 10/21/2022   Component Date Value Ref Range Status   • GRP A STREP 10/21/2022 Positive (A) Negative Final   • Internal Procedural Controls Accep* 10/21/2022 Yes   Final       ASSESSMENT AND  PLAN:  This 21 year old female presents with :    Problem List Items Addressed This Visit     Vitamin D deficiency    Relevant Orders    VITAMIN D -25 HYDROXY      Other Visit Diagnoses     Gastroesophageal reflux disease without esophagitis    -  Primary    Relevant Orders    SERVICE TO GASTROENTEROLOGY    Encounter for general adult medical examination with abnormal findings        Relevant Orders    CBC WITH DIFFERENTIAL    COMPREHENSIVE METABOLIC PANEL    LIPID PANEL WITH REFLEX    THYROID STIMULATING HORMONE REFLEX            PLAN:    Encounter for general adult medical examination with abnormal findings  Labs ordered. Refer to the orders.  Explained age appropriate cancer screening recommendations. No PAP screening required due to age  Vaccination recommendations discussed. Patient will forward all her records for vaccination.  - CBC WITH DIFFERENTIAL; Future  - COMPREHENSIVE METABOLIC PANEL; Future  - LIPID PANEL WITH REFLEX; Future  - THYROID STIMULATING HORMONE REFLEX; Future    Gastroesophageal reflux disease without esophagitis  Reviewed and discussed previous reports.  Prescribed Famotidine as needed. Benefits reviewed. Mechanism of action discussed.  Advised to avoid late night eating, spicy food. To keep a 3-hour gap between dinner and going to bed. Rationale explained.  Discussed about H.Pylori and its pathophysiology in causing recurrent heartburn.  Recommended getting EGD done, explained in detail. Patient agreeable.  Discussed the possibility of diet being the cause, and the treatment might need no medications.    Referral for SERVICE TO GASTROENTEROLOGY provided.  - SERVICE TO GASTROENTEROLOGY    Vitamin D deficiency  Reviewed and discussed previous reports.  - VITAMIN D -25 HYDROXY; Future        Advised to follow-up in a year or sooner if needed.    Refer to orders.  Medical compliance with plan discussed and risks of non-compliance reviewed.  Patient education completed on  disease process, etiology & prognosis.  Proper usage and side effects of medications reviewed & discussed.  Return to clinic as clinically indicated as discussed with the patient who verbalized understanding of the plan and is in agreement with the plan.    I,  Dr. Janak Nguyễn, have created a visit summary document based on the audio recording between Dr. Debra Lauren MD and this patient for the physician to review, edit as needed, and authenticate.    Creation Date: 11/9/2022      I have reviewed and edited the visit summary above and attest that it is accurate.      Electronically signed by:  Debra Lauren MD  [facility] [place] [id number]  [address] [place] [id number]  Dept Phone: [phone]      No

## 2024-06-12 NOTE — ED PROVIDER NOTE - PATIENT PORTAL LINK FT
You can access the FollowMyHealth Patient Portal offered by [facility] by registering at the following website: [email or web address]. By joining [facility] FollowMyHealth portal, you will also be able to view your health information using other applications (apps) compatible with our system.

## 2024-06-12 NOTE — ED ADULT TRIAGE NOTE - CHIEF COMPLAINT QUOTE
pt c/o dizziness, insomnia, forgetfulness and nausea after taking topiramate yesterday. sent to be evaluated by neurologist. history migraines and pituitary tumor.

## 2024-06-17 ENCOUNTER — APPOINTMENT (OUTPATIENT)
Dept: OBGYN | Facility: CLINIC | Age: 39
End: 2024-06-17
Payer: MEDICAID

## 2024-06-17 VITALS — BODY MASS INDEX: 22.14 KG/M2 | SYSTOLIC BLOOD PRESSURE: 102 MMHG | DIASTOLIC BLOOD PRESSURE: 68 MMHG | WEIGHT: 129 LBS

## 2024-06-17 DIAGNOSIS — R30.0 DYSURIA: ICD-10-CM

## 2024-06-17 PROCEDURE — 99459 PELVIC EXAMINATION: CPT

## 2024-06-17 PROCEDURE — 99213 OFFICE O/P EST LOW 20 MIN: CPT

## 2024-06-17 RX ORDER — FLUCONAZOLE 150 MG/1
150 TABLET ORAL
Qty: 2 | Refills: 0 | Status: ACTIVE | COMMUNITY
Start: 2024-06-17 | End: 1900-01-01

## 2024-06-17 RX ORDER — TOPIRAMATE 50 MG/1
50 TABLET, FILM COATED ORAL
Qty: 60 | Refills: 2 | Status: DISCONTINUED | COMMUNITY
Start: 2024-05-16 | End: 2024-06-17

## 2024-06-17 RX ORDER — FLUCONAZOLE 150 MG/1
150 TABLET ORAL
Qty: 3 | Refills: 0 | Status: DISCONTINUED | COMMUNITY
Start: 2022-09-22 | End: 2024-06-17

## 2024-06-17 RX ORDER — TERCONAZOLE 4 MG/G
0.4 CREAM VAGINAL
Qty: 1 | Refills: 0 | Status: DISCONTINUED | COMMUNITY
Start: 2022-10-31 | End: 2024-06-17

## 2024-06-17 RX ORDER — CLOTRIMAZOLE AND BETAMETHASONE DIPROPIONATE 10; .5 MG/G; MG/G
1-0.05 CREAM TOPICAL TWICE DAILY
Qty: 3 | Refills: 3 | Status: DISCONTINUED | COMMUNITY
Start: 2022-09-22 | End: 2024-06-17

## 2024-06-17 RX ORDER — CLOTRIMAZOLE AND BETAMETHASONE DIPROPIONATE 10; .5 MG/G; MG/G
1-0.05 CREAM TOPICAL TWICE DAILY
Qty: 1 | Refills: 3 | Status: ACTIVE | COMMUNITY
Start: 2024-06-17 | End: 1900-01-01

## 2024-06-17 RX ORDER — CLOTRIMAZOLE 1 %
1 CREAM WITH APPLICATOR VAGINAL
Qty: 7 | Refills: 0 | Status: DISCONTINUED | COMMUNITY
Start: 2022-09-22 | End: 2024-06-17

## 2024-06-17 RX ORDER — CLOTRIMAZOLE AND BETAMETHASONE DIPROPIONATE 10; .5 MG/G; MG/G
1-0.05 CREAM TOPICAL TWICE DAILY
Qty: 1 | Refills: 0 | Status: DISCONTINUED | COMMUNITY
Start: 2022-10-31 | End: 2024-06-17

## 2024-06-19 ENCOUNTER — NON-APPOINTMENT (OUTPATIENT)
Age: 39
End: 2024-06-19

## 2024-06-26 NOTE — PHYSICAL EXAM
[Chaperone Present] : A chaperone was present in the examining room during all aspects of the physical examination [[id number]] : A chaperone was present during the pelvic exam. [Appropriately responsive] : appropriately responsive [Alert] : alert [No Acute Distress] : no acute distress [Soft] : soft [Non-tender] : non-tender [Non-distended] : non-distended [No HSM] : No HSM [No Lesions] : no lesions [No Mass] : no mass [Oriented x3] : oriented x3 [Labia Majora] : normal [Labia Minora] : normal [Discharge] : a  ~M vaginal discharge was present [White] : white [Thick] : thick [Normal] : normal [Uterine Adnexae] : normal

## 2024-06-26 NOTE — HISTORY OF PRESENT ILLNESS
[No] : Patient does not have concerns regarding sex [Currently Active] : currently active [Men] : men [Vaginal] : vaginal [Condoms] : Condoms [FreeTextEntry1] : 39 yo female presents today c/o vaginal discharge and itching x 2 weeks. She denies vaginal odor. Patient c/o dysuria and frequency x 2 weeks.

## 2024-06-26 NOTE — PLAN
[FreeTextEntry1] : 39 yo female with vaginal discharge and itching: -f/u bd affirm, gc/ct, and uac -rx for diflucan sent to pharmacy -f/u prn

## 2024-09-15 ENCOUNTER — NON-APPOINTMENT (OUTPATIENT)
Age: 39
End: 2024-09-15

## 2024-09-16 ENCOUNTER — APPOINTMENT (OUTPATIENT)
Dept: NEUROLOGY | Facility: CLINIC | Age: 39
End: 2024-09-16
Payer: MEDICAID

## 2024-09-16 VITALS
HEART RATE: 93 BPM | DIASTOLIC BLOOD PRESSURE: 69 MMHG | BODY MASS INDEX: 20.83 KG/M2 | WEIGHT: 122 LBS | SYSTOLIC BLOOD PRESSURE: 101 MMHG | HEIGHT: 64 IN

## 2024-09-16 DIAGNOSIS — R51.9 HEADACHE, UNSPECIFIED: ICD-10-CM

## 2024-09-16 PROCEDURE — G2211 COMPLEX E/M VISIT ADD ON: CPT | Mod: NC

## 2024-09-16 PROCEDURE — 99215 OFFICE O/P EST HI 40 MIN: CPT

## 2024-09-16 RX ORDER — DULOXETINE HYDROCHLORIDE 30 MG/1
30 CAPSULE, DELAYED RELEASE PELLETS ORAL
Qty: 90 | Refills: 4 | Status: ACTIVE | COMMUNITY
Start: 2024-09-16 | End: 1900-01-01

## 2024-09-16 NOTE — DISCUSSION/SUMMARY
[FreeTextEntry1] : 39-year-old woman who is here for follow-up of her headaches and dizziness that are likely somatic manifestations of her homesickness and isolation even with her .  Patient will be given a prescription for psychology counseling.  Patient will start Cymbalta 30 mg daily for headache relief.  She will follow-up with me in a couple months.  I spent the time noted on the day of this patient encounter preparing for, review of medical records,review of pertinent diagnostic studies, providing and documenting the above E/M service and counseling and educate patient on differential, workup, disease course, and treatment/management. Education was provided to the patient during this encounter. All questions and concerns were answered and addressed in detail. The patient verbalized understanding and agreed to plan. Patient was advised to continue to monitor for neurologic symptoms and to notify my office or go to the nearest emergency room if there are any changes. Any orders/referrals and communications were provided as well. Side effects of the above medications were discussed in detail including but not limited to applicable black box warning and teratogenicity as appropriate. Patient was advised to bring previous records to my office. A copy of the consult note will be sent to the referring physician.

## 2024-09-16 NOTE — HISTORY OF PRESENT ILLNESS
[FreeTextEntry1] : Pt was last seen in 2016. Since then, she was tried on nortriptyline but b/c it caused sedation, she stopped taking it. She states over the past 2 months, she had migraine exacerbation. Now that she's on the 2nd month of her pregnancy, she is feeling better. She has nausea due to pregnancy.  Interval history October 16, 2023: Patient has been having migraines about 15 to 18 days a month.  Patient had irregular menses and her prolactin level was checked by her GYN which showed elevation.  Patient had an MRI of the brain which showed a 3 mm nodular focus of hypoenhancement in the left posterior adenohypophysis which was suggestive of microadenoma.  Patient has a pending endocrine evaluation.  Patient denies any constant visual complaints but there is blurred vision at times.  Patient has tried nortriptyline for headaches in the past but stopped because of sedation.  Patient states that she would like to restart the nortriptyline as she is experiencing insomnia recently.  Interval history February 16, 2024: Since taking nortriptyline every day patient's migraines have improved.  Patient reports no side effects.  Since October patient has only had 2 migraines.  Patient has a new medication of cabergoline from her endocrinologist for her pituitary adenoma.  Patient is doing well.  Interval history 5/16/2024: Patient started experiencing constipation with nortriptyline x 2.  Patient also experience dry mouth and therefore has stopped the nortriptyline.  Patient has no history of glaucoma or kidney stones and therefore we will try Topamax 50 mg twice daily for prevention of her migraine.  Interval history September 16, 2024: Patient is here for follow-up of her headaches and has been sensation.  Patient had a month-long trip to [facility] where her family is and she experienced no headache patient was with family and friends with her kids.  From her last visit patient was started on Topamax however patient experienced side effects of dizziness and had stopped.  Patient was also tried on nortriptyline which caused her constipation and it was stopped as well.  Patient has a history of pituitary adenoma for which she follows up with her endocrinologist.  Since coming back from [facility] patient has been feeling homesick and isolated.  She was previously advised by other physicians to see a psychologist for her stressors of homesickness isolation.  Her  stopped her from seeking help saying she did not need it.  Patient was advised that her  has any questions he can reach out to me.

## 2024-10-04 ENCOUNTER — APPOINTMENT (OUTPATIENT)
Dept: OBGYN | Facility: CLINIC | Age: 39
End: 2024-10-04
Payer: MEDICAID

## 2024-10-04 VITALS
HEIGHT: 64 IN | DIASTOLIC BLOOD PRESSURE: 71 MMHG | WEIGHT: 126 LBS | BODY MASS INDEX: 21.51 KG/M2 | SYSTOLIC BLOOD PRESSURE: 108 MMHG

## 2024-10-04 DIAGNOSIS — Z80.41 FAMILY HISTORY OF MALIGNANT NEOPLASM OF OVARY: ICD-10-CM

## 2024-10-04 DIAGNOSIS — N89.8 OTHER SPECIFIED NONINFLAMMATORY DISORDERS OF VAGINA: ICD-10-CM

## 2024-10-04 DIAGNOSIS — Z01.419 ENCOUNTER FOR GYNECOLOGICAL EXAMINATION (GENERAL) (ROUTINE) W/OUT ABNORMAL FINDINGS: ICD-10-CM

## 2024-10-04 PROCEDURE — 99396 PREV VISIT EST AGE 40-64: CPT

## 2024-10-04 PROCEDURE — 99459 PELVIC EXAMINATION: CPT

## 2024-10-04 PROCEDURE — 36415 COLL VENOUS BLD VENIPUNCTURE: CPT

## 2024-10-04 RX ORDER — MULTIVITAMIN
TABLET ORAL
Refills: 0 | Status: ACTIVE | COMMUNITY

## 2024-10-04 RX ORDER — FLUCONAZOLE 150 MG/1
150 TABLET ORAL
Qty: 2 | Refills: 0 | Status: ACTIVE | COMMUNITY
Start: 2024-10-04 | End: 1900-01-01

## 2024-10-04 NOTE — PLAN
[FreeTextEntry1] : 38 y/o female presenting for annual exam: -f/u pap and GC/CT, bd affirm, uac and std panel -Requisition given for mammogram -RX for diflucan sent to pharmacy -recommended probiotics daily -Contraception: condoms -f/u PRN

## 2024-10-04 NOTE — PHYSICAL EXAM
[Chaperone Present] : A chaperone was present in the examining room during all aspects of the physical examination [[id number]] : A chaperone was present during the pelvic exam. [Appropriately responsive] : appropriately responsive [Alert] : alert [No Acute Distress] : no acute distress [Soft] : soft [Non-tender] : non-tender [Non-distended] : non-distended [No HSM] : No HSM [No Lesions] : no lesions [No Mass] : no mass [Oriented x3] : oriented x3 [Examination Of The Breasts] : a normal appearance [No Masses] : no breast masses were palpable [Labia Majora] : normal [Labia Minora] : normal [Normal] : normal [Uterine Adnexae] : normal [Discharge] : a  ~M vaginal discharge was present [Heavy] : heavy [White] : white [Cheesy] : cheesy [FreeTextEntry2] : De[place]ra

## 2024-10-04 NOTE — HISTORY OF PRESENT ILLNESS
[Patient reported mammogram was normal] : Patient reported mammogram was normal [Y] : Positive pregnancy history [Regular Cycle Intervals] : periods have been regular [Currently Active] : currently active [Men] : men [Vaginal] : vaginal [No] : No [Condoms] : Condoms [Patient would like to be screened for STIs] : Patient would like to be screened for STIs [Mammogramdate] : 2023 [PGHxTotal] : 3 [[facility] : [facility] [PGxPremature] : 1 [PGHxAbortions] : 0 [[facility] Center[facility]] : 3 [PGHxABInduced] : 0 [PGHxABSpont] : 0 [PGHxEctopic] : 0 [PGHxMultBirths] : 0 [FreeTextEntry1] : 09/14/24

## 2024-10-05 LAB
APPEARANCE: CLEAR
BACTERIA: NEGATIVE /HPF
BASOPHILS # BLD AUTO: 0.07 K/UL
BASOPHILS NFR BLD AUTO: 0.7 %
BILIRUBIN URINE: NEGATIVE
BLOOD URINE: NEGATIVE
CAST: 0 /LPF
COLOR: YELLOW
EOSINOPHIL # BLD AUTO: 0.22 K/UL
EOSINOPHIL NFR BLD AUTO: 2.3 %
EPITHELIAL CELLS: 2 /HPF
GLUCOSE QUALITATIVE U: NEGATIVE MG/DL
HBV SURFACE AG SER QL: NONREACTIVE
HCT VFR BLD CALC: 41.9 %
HGB BLD-MCNC: 13.5 G/DL
HIV1+2 AB SPEC QL IA.RAPID: NONREACTIVE
IMM GRANULOCYTES NFR BLD AUTO: 0.2 %
KETONES URINE: NEGATIVE MG/DL
LEUKOCYTE ESTERASE URINE: NEGATIVE
LYMPHOCYTES # BLD AUTO: 2.95 K/UL
LYMPHOCYTES NFR BLD AUTO: 31.2 %
MAN DIFF?: NORMAL
MCHC RBC-ENTMCNC: 27.7 PG
MCHC RBC-ENTMCNC: 32.2 GM/DL
MCV RBC AUTO: 86 FL
MICROSCOPIC-UA: NORMAL
MONOCYTES # BLD AUTO: 0.64 K/UL
MONOCYTES NFR BLD AUTO: 6.8 %
NEUTROPHILS # BLD AUTO: 5.57 K/UL
NEUTROPHILS NFR BLD AUTO: 58.8 %
NITRITE URINE: NEGATIVE
PH URINE: 7.5
PLATELET # BLD AUTO: 273 K/UL
PROTEIN URINE: NEGATIVE MG/DL
RBC # BLD: 4.87 M/UL
RBC # FLD: 13.6 %
RED BLOOD CELLS URINE: 2 /HPF
SPECIFIC GRAVITY URINE: 1.02
T PALLIDUM AB SER QL IA: NEGATIVE
UROBILINOGEN URINE: 0.2 MG/DL
WBC # FLD AUTO: 9.47 K/UL
WHITE BLOOD CELLS URINE: 0 /HPF

## 2024-10-07 ENCOUNTER — NON-APPOINTMENT (OUTPATIENT)
Age: 39
End: 2024-10-07

## 2024-10-07 LAB
BACTERIA UR CULT: NORMAL
CANDIDA VAG CYTO: DETECTED
G VAGINALIS+PREV SP MTYP VAG QL MICRO: NOT DETECTED
HPV HIGH+LOW RISK DNA PNL CVX: NOT DETECTED
HPV HIGH+LOW RISK DNA PNL CVX: NOT DETECTED
HSV 1+2 IGG SER IA-IMP: NEGATIVE
HSV 1+2 IGG SER IA-IMP: POSITIVE
HSV1 IGG SER QL: 51.6 INDEX
HSV2 IGG SER QL: 0.07 INDEX
T VAGINALIS VAG QL WET PREP: NOT DETECTED

## 2024-10-08 LAB
C TRACH RRNA SPEC QL NAA+PROBE: NOT DETECTED
HPV 16 E6+E7 MRNA CVX QL NAA+PROBE: NOT DETECTED
HPV18+45 E6+E7 MRNA CVX QL NAA+PROBE: NOT DETECTED
N GONORRHOEA RRNA SPEC QL NAA+PROBE: NOT DETECTED
SOURCE AMPLIFICATION: NORMAL

## 2024-10-12 LAB — CYTOLOGY CVX/VAG DOC THIN PREP: NORMAL

## 2024-10-18 NOTE — OB PST NOTE - NSHPREVIEWOFSYSTEMS_GEN_ALL_CORE
Diabetic Eye Exam Form    Date Requested: 10/25/24  Patient: Gadiel Gallegos  Patient : 1943   Referring Provider: Tru Dumont,       DIABETIC Eye Exam Date _______________________________      Type of Exam MUST be documented for Diabetic Eye Exams. Please CHECK ONE.     Retinal Exam       Dilated Retinal Exam       OCT       Optomap-Iris Exam      Fundus Photography       Left Eye - Please check Retinopathy or No Retinopathy        Exam did show retinopathy    Exam did not show retinopathy       Right Eye - Please check Retinopathy or No Retinopathy       Exam did show retinopathy    Exam did not show retinopathy       Comments _This year  _________________________________________________________    Practice Providing Exam ______________________________________________    Exam Performed By (print name) _______________________________________      Provider Signature ___________________________________________________      These reports are needed for  compliance.  Please fax this completed form and a copy of the Diabetic Eye Exam report to our office located at [address] [place] [address] as soon as possible via Fax [phone] attention Norma: Phone [phone]  We thank you for your assistance in treating our mutual patient.       General: pt denies traveling or sick contact in the past 14 days, No fever, chills, sweating, anorexia, weight loss or weight gain. No polyphagia, polyurea, polydypsia, malaise, or fatigue    Skin: No rashes, itching, or dryness. No change in size/color of moles. No tumors, brittle nails, pitted nails, or hair loss    Breast: No tenderness, lumps, or nipple discharge      Ophthalmologic: No diplopia, photophobia, lacrimation, blurred Vision , or eye discharge    ENMT Symptoms: No hearing difficulty, ear pain, tinnitus, or vertigo. No sinus symptoms, nasal congestion, nasal   discharge, or nasal obstruction    Respiratory and Thorax: No wheezing, dyspnea, cough, hemoptysis, or pleuritic chest pain     Cardiovascular: No chest pain, palpitations, dyspnea on exertion, orthopnea, paroxysmal nocturnal dyspnea,   peripheral edema, or claudication    Gastrointestinal: No nausea, vomiting, diarrhea, constipation, change in bowel habits, flatulence, abdominal pain, or melena    Genitourinary/ Pelvis: No hematuria, renal colic, or flank pain.  No urine discoloration, incontinence, dysuria, or urinary hesitancy. Normal urinary frequency. No nocturia, abnormal vaginal bleeding, vaginal discharge, spotting, pelvic pain, or vaginal leakage    Musculoskeletal: intermittent low back pain, localized, No arthralgia, arthritis, joint swelling, muscle cramping, muscle weakness, neck pain, arm pain, or leg pain    Neurological: reports hx of headaches "I started to have HA after epidural", No transient paralysis, weakness, paresthesias, or seizures. No syncope, tremors, vertigo, loss of sensation, difficulty walking, loss of consciousness, hemiparesis, confusion, or facial palsy    Psychiatric: hx of depression after  in 2015, denies further episodes of depression, denies suicidal ideation, anxiety, insomnia, memory loss, paranoia, mood swings, agitation, hallucinations, or hyperactivity    Hematology: No gum bleeding, nose bleeding, or skin lumps    Lymphatic: No enlarged or tender lymph nodes. No extremity swelling    Endocrine: No heat or cold intolerance    Immunologic: No recurrent or persistent infections

## 2024-10-21 ENCOUNTER — NON-APPOINTMENT (OUTPATIENT)
Age: 39
End: 2024-10-21
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
## 2024-11-07 ENCOUNTER — APPOINTMENT (OUTPATIENT)
Dept: SPINE | Facility: CLINIC | Age: 39
End: 2024-11-07

## 2024-12-06 NOTE — ED ADULT NURSE NOTE - STOOL PATTERN
antibiotic if not having symptoms relief   OTC Delsym or Robitussin   Plenty of fluids  Can use honey   Cool mist humidifier   Warm gargle with salt water for sore throat   Use Tylenol/ibuprofen as needed for fever or pain    Follow up with PCP in 3-5 days.  Proceed to  ER if symptoms worsen.    If tests are performed, our office will contact you with results only if changes need to made to the care plan discussed with you at the visit. You can review your full results on [facility] Mychart.   diarrhea

## 2025-02-14 NOTE — ED ADULT NURSE NOTE - BREATHING, MLM
Anesthesia Post-op Note    Patient: Daisy Negrouin  Procedure(s) Performed:  SECTION (Abdomen)  Anesthesia type: Spinal    Vitals Value Taken Time   Temp 36.1 °C (97 °F) 25 1205   Pulse 88 25 1205   Resp 14 25 1205   SpO2 98 % 25 1205   /89 25 1205         Patient Location: PACU Phase 1  Post-op Vital Signs:stable  Level of Consciousness: awake and alert  Respiratory Status: spontaneous ventilation  Cardiovascular stable  Hydration: euvolemic  Pain Management: adequately controlled  Handoff: Handoff to receiving clinician was performed and questions were answered  Vomiting: none  Nausea: None  Airway Patency:patent  Post-op Assessment: no complications and patient tolerated procedure well      No notable events documented.                      
Spontaneous, unlabored and symmetrical

## 2025-03-29 ENCOUNTER — NON-APPOINTMENT (OUTPATIENT)
Age: 40
End: 2025-03-29

## 2025-04-04 ENCOUNTER — APPOINTMENT (OUTPATIENT)
Dept: OBGYN | Facility: CLINIC | Age: 40
End: 2025-04-04
Payer: MEDICAID

## 2025-04-04 VITALS — WEIGHT: 133 LBS | DIASTOLIC BLOOD PRESSURE: 60 MMHG | BODY MASS INDEX: 22.83 KG/M2 | SYSTOLIC BLOOD PRESSURE: 113 MMHG

## 2025-04-04 DIAGNOSIS — Z87.42 PERSONAL HISTORY OF OTHER DISEASES OF THE FEMALE GENITAL TRACT: ICD-10-CM

## 2025-04-04 DIAGNOSIS — N89.8 OTHER SPECIFIED NONINFLAMMATORY DISORDERS OF VAGINA: ICD-10-CM

## 2025-04-04 PROCEDURE — 99459 PELVIC EXAMINATION: CPT

## 2025-04-04 PROCEDURE — 99213 OFFICE O/P EST LOW 20 MIN: CPT

## 2025-04-04 RX ORDER — CLOTRIMAZOLE AND BETAMETHASONE DIPROPIONATE 10; .5 MG/G; MG/G
1-0.05 CREAM TOPICAL TWICE DAILY
Qty: 1 | Refills: 0 | Status: ACTIVE | COMMUNITY
Start: 2025-04-04 | End: 1900-01-01

## 2025-04-04 RX ORDER — FLUCONAZOLE 150 MG/1
150 TABLET ORAL
Qty: 2 | Refills: 0 | Status: ACTIVE | COMMUNITY
Start: 2025-04-04 | End: 1900-01-01

## 2025-04-06 LAB
APPEARANCE: CLEAR
BACTERIA UR CULT: NORMAL
BACTERIA: ABNORMAL /HPF
BILIRUBIN URINE: NEGATIVE
BLOOD URINE: NEGATIVE
CAST: 2 /LPF
COLOR: YELLOW
EPITHELIAL CELLS: 4 /HPF
GLUCOSE QUALITATIVE U: NEGATIVE MG/DL
KETONES URINE: NEGATIVE MG/DL
LEUKOCYTE ESTERASE URINE: NEGATIVE
MICROSCOPIC-UA: NORMAL
NITRITE URINE: NEGATIVE
PH URINE: 7
PROTEIN URINE: NEGATIVE MG/DL
RED BLOOD CELLS URINE: 2 /HPF
SPECIFIC GRAVITY URINE: 1.03
UROBILINOGEN URINE: 0.2 MG/DL
WHITE BLOOD CELLS URINE: 0 /HPF

## 2025-04-07 LAB
CANDIDA VAG CYTO: NOT DETECTED
G VAGINALIS+PREV SP MTYP VAG QL MICRO: DETECTED
T VAGINALIS VAG QL WET PREP: NOT DETECTED

## 2025-04-07 RX ORDER — METRONIDAZOLE 500 MG/1
500 TABLET ORAL TWICE DAILY
Qty: 14 | Refills: 0 | Status: ACTIVE | COMMUNITY
Start: 2025-04-07 | End: 1900-01-01

## 2025-05-24 ENCOUNTER — NON-APPOINTMENT (OUTPATIENT)
Age: 40
End: 2025-05-24

## 2025-06-02 ENCOUNTER — NON-APPOINTMENT (OUTPATIENT)
Age: 40
End: 2025-06-02